# Patient Record
Sex: FEMALE | Race: WHITE | NOT HISPANIC OR LATINO | Employment: FULL TIME | URBAN - METROPOLITAN AREA
[De-identification: names, ages, dates, MRNs, and addresses within clinical notes are randomized per-mention and may not be internally consistent; named-entity substitution may affect disease eponyms.]

---

## 2017-01-13 ENCOUNTER — TELEPHONE (OUTPATIENT)
Dept: FAMILY MEDICINE CLINIC | Facility: CLINIC | Age: 47
End: 2017-01-13

## 2017-01-13 RX ORDER — PHENTERMINE HYDROCHLORIDE 37.5 MG/1
37.5 CAPSULE ORAL EVERY MORNING
Qty: 30 CAPSULE | Refills: 1 | Status: SHIPPED | OUTPATIENT
Start: 2017-01-13 | End: 2017-01-31 | Stop reason: SDUPTHER

## 2017-01-13 NOTE — TELEPHONE ENCOUNTER
Patients office visit was rescheduled to Jan 31st.  She wants to know if she can get another Rx Phentermine since she will be out of them on Monday.

## 2017-01-31 ENCOUNTER — TELEPHONE (OUTPATIENT)
Dept: FAMILY MEDICINE CLINIC | Facility: CLINIC | Age: 47
End: 2017-01-31

## 2017-01-31 ENCOUNTER — OFFICE VISIT (OUTPATIENT)
Dept: FAMILY MEDICINE CLINIC | Facility: CLINIC | Age: 47
End: 2017-01-31

## 2017-01-31 ENCOUNTER — HOSPITAL ENCOUNTER (OUTPATIENT)
Dept: GENERAL RADIOLOGY | Facility: HOSPITAL | Age: 47
Discharge: HOME | End: 2017-01-31
Admitting: FAMILY MEDICINE

## 2017-01-31 VITALS
OXYGEN SATURATION: 99 % | RESPIRATION RATE: 16 BRPM | BODY MASS INDEX: 35 KG/M2 | HEIGHT: 67 IN | WEIGHT: 223 LBS | DIASTOLIC BLOOD PRESSURE: 82 MMHG | HEART RATE: 97 BPM | SYSTOLIC BLOOD PRESSURE: 122 MMHG

## 2017-01-31 DIAGNOSIS — R73.9 HYPERGLYCEMIA: Primary | ICD-10-CM

## 2017-01-31 DIAGNOSIS — G89.29 HIP PAIN, CHRONIC, UNSPECIFIED LATERALITY: ICD-10-CM

## 2017-01-31 DIAGNOSIS — E66.01 OBESITY, CLASS III, BMI 40-49.9 (MORBID OBESITY) (HCC): ICD-10-CM

## 2017-01-31 DIAGNOSIS — E78.2 MIXED HYPERLIPIDEMIA: ICD-10-CM

## 2017-01-31 DIAGNOSIS — I10 ESSENTIAL HYPERTENSION: ICD-10-CM

## 2017-01-31 DIAGNOSIS — M25.559 HIP PAIN, CHRONIC, UNSPECIFIED LATERALITY: ICD-10-CM

## 2017-01-31 PROCEDURE — 73521 X-RAY EXAM HIPS BI 2 VIEWS: CPT

## 2017-01-31 PROCEDURE — 99214 OFFICE O/P EST MOD 30 MIN: CPT | Performed by: FAMILY MEDICINE

## 2017-01-31 RX ORDER — PHENTERMINE HYDROCHLORIDE 37.5 MG/1
37.5 CAPSULE ORAL EVERY MORNING
Qty: 30 CAPSULE | Refills: 1 | Status: SHIPPED | OUTPATIENT
Start: 2017-01-31 | End: 2017-01-31 | Stop reason: SDUPTHER

## 2017-01-31 RX ORDER — PHENTERMINE HYDROCHLORIDE 37.5 MG/1
37.5 CAPSULE ORAL EVERY MORNING
Qty: 30 CAPSULE | Refills: 1 | Status: SHIPPED | OUTPATIENT
Start: 2017-01-31 | End: 2017-02-20 | Stop reason: SDUPTHER

## 2017-01-31 NOTE — MR AVS SNAPSHOT
Mercy Barclaygg   1/31/2017 1:00 PM   Office Visit    Provider:  Adriana Lund MD   Department:  [facility]   Dept Phone:  [phone]                Your Full Care Plan              Today's Medication Changes          These changes are accurate as of: 1/31/17  2:25 PM.  If you have any questions, ask your nurse or doctor.               Medication(s)that have changed:     Topiramate ER 25 MG capsule sustained-release 24 hr   Commonly known as:  TROKENDI XR   Take 1 tablet by mouth Daily.   What changed:  Another medication with the same name was removed. Continue taking this medication, and follow the directions you see here.   Changed by:  Ritu Nichols MA         Stop taking medication(s)listed here:     NEXIUM 40 MG capsule   Generic drug:  esomeprazole   Stopped by:  Adriana Lund MD                Where to Get Your Medications      You can get these medications from any pharmacy     Bring a paper prescription for each of these medications     phentermine 37.5 MG capsule                  Your Updated Medication List          This list is accurate as of: 1/31/17  2:25 PM.  Always use your most recent med list.                hydrochlorothiazide 25 MG tablet   Commonly known as:  HYDRODIURIL   TAKE 1 TABLET EVERY DAY       levothyroxine sodium 137 MCG capsule   Commonly known as:  TIROSINT   Take 1 capsule by mouth Daily.       liothyronine 5 MCG tablet   Commonly known as:  CYTOMEL       loratadine-pseudoephedrine 5-120 MG per 12 hr tablet   Commonly known as:  CLARITIN-D 12-hour       olopatadine 0.2 % solution ophthalmic solution   Commonly known as:  PATADAY       phentermine 37.5 MG capsule   Take 1 capsule by mouth Every Morning.       rizatriptan MLT 10 MG disintegrating tablet   Commonly known as:  MAXALT-MLT       Topiramate ER 25 MG capsule sustained-release 24 hr   Commonly known as:  TROKENDI XR   Take 1 tablet by mouth Daily.               We Performed the Following     CBC and Differential     Comprehensive metabolic panel     Hemoglobin A1c     Lipid Panel With LDL/HDL Ratio     XR Hips Bilateral With or Without Pelvis 2 View       You Were Diagnosed With        Codes Comments    Hyperglycemia    -  Primary ICD-10-CM: R73.9  ICD-9-CM: 790.29     Mixed hyperlipidemia     ICD-10-CM: E78.2  ICD-9-CM: 272.2     Essential hypertension     ICD-10-CM: I10  ICD-9-CM: 401.9     Hip pain, chronic, unspecified laterality     ICD-10-CM: M25.559, G89.29  ICD-9-CM: 719.45, 338.29     Obesity, Class III, BMI 40-49.9 (morbid obesity)     ICD-10-CM: E66.01  ICD-9-CM: 278.01       Instructions     None    Patient Instructions History      [email or web address] Signup     Our records indicate that you have an active [facility] account.    You can view your After Visit Summary by going to [employer] and logging in with your [employer] username and password.  If you don't have a [employer] username and password but a parent or guardian has access to your record, the parent or guardian should login with their own [employer] username and password and access your record to view the After Visit Summary.    If you have questions, you can email [email or web address] or call [phone] to talk to our [employer] staff.  Remember, [employer] is NOT to be used for urgent needs.  For medical emergencies, dial 911.               Other Info from Your Visit           Your Appointments     Apr 28, 2017  1:00 PM EDT   Office Visit with Adriana Lund MD   [facility] (--)    [address] [address] [phone]           Arrive 15 minutes prior to appointment.              Allergies     Victoza [Liraglutide]  GI Intolerance    Colitis by CT      Reason for Visit     Hyperlipidemia     Hypothyroidism           Vital Signs     Blood Pressure Pulse Respirations Height Weight Oxygen Saturation    122/82 (BP  "Location: Right arm, Patient Position: Sitting, Cuff Size: Adult) 97 16 66.5\" (168.9 cm) 223 lb (101 kg) 99%    Body Mass Index Smoking Status                35.45 kg/m2 Never Smoker          Problems and Diagnoses Noted     High blood pressure    Hyperglycemia    High cholesterol or triglycerides    Obesity, Class III, BMI 40-49.9 (morbid obesity)    Chronic hip pain          No Longer an Issue     Tiredness      Results       "

## 2017-01-31 NOTE — PROGRESS NOTES
Subjective   Mercy Duran is a 46 y.o. female.   She  is here today to f/u on   Chief Complaint   Patient presents with   • Hyperlipidemia   • Hypothyroidism   .     History of Present Illness   Mrs. Duran has lost another 15-20 pounds since last visit.  Has continued to change her diet at this point is on very few carbs and eats no processed food.  No soft drinks drinking water.  Was told had hip displasia as child and has had hip pain 35 yrs. Using a lot of NSAIDS.  Her exercise is limited by the pain and that is keeping her from losing as much.  May begin swimming.  No longer taking Nexium and GI upset has resolved.  Fasting blood sugar is 1:30 on today's labs for review even with weight loss.  Has been lower on previous labs.  She was placed on victoza for this same reason last year which caused a hemorrhagic gastroenteritis.  Will hold off on any other pharmacologic intervention as she is continuing to lose weight.  The following portions of the patient's history were reviewed and updated as appropriate: current medications, past family history, past medical history, past social history, past surgical history and problem list.    Review of Systems   Constitutional: Negative.    HENT: Negative.    Eyes: Negative.    Respiratory: Negative.    Cardiovascular: Negative.    Gastrointestinal: Negative.    Endocrine: Negative.    Genitourinary: Negative.    Musculoskeletal: Negative.    Skin: Negative.    Allergic/Immunologic: Negative.    Neurological: Negative.    Hematological: Negative.    Psychiatric/Behavioral: Negative.    All other systems reviewed and are negative.      Objective    Vitals:    01/31/17 1303   BP: 122/82   Pulse: 97   Resp: 16   SpO2: 99%     BP Readings from Last 3 Encounters:   01/31/17 122/82   10/20/16 122/88   09/16/16 120/82       Physical Exam   Constitutional: She is oriented to person, place, and time. She appears well-developed and well-nourished.   Obese   HENT:   Head:  Normocephalic and atraumatic.   Eyes: EOM are normal. Pupils are equal, round, and reactive to light.   Neck: Normal range of motion. Neck supple. No thyromegaly present.   Cardiovascular: Normal rate, regular rhythm, normal heart sounds and intact distal pulses.    Pulmonary/Chest: Effort normal and breath sounds normal.   Musculoskeletal: Normal range of motion. She exhibits no edema.   Lymphadenopathy:     She has no cervical adenopathy.   Neurological: She is alert and oriented to person, place, and time. No cranial nerve deficit.   Skin: Skin is warm and dry.   Psychiatric: She has a normal mood and affect. Her behavior is normal. Judgment and thought content normal.   Nursing note and vitals reviewed.      Invalid input(s): 2W    Assessment/Plan   Mercy was seen today for hyperlipidemia and hypothyroidism.    Diagnoses and all orders for this visit:    Hyperglycemia  -     Comprehensive metabolic panel; Future  -     Lipid Panel With LDL/HDL Ratio; Future  -     CBC and Differential; Future  -     Hemoglobin A1c; Future  -     Comprehensive metabolic panel  -     Lipid Panel With LDL/HDL Ratio  -     CBC and Differential  -     Hemoglobin A1c    Mixed hyperlipidemia  -     Comprehensive metabolic panel; Future  -     Lipid Panel With LDL/HDL Ratio; Future  -     CBC and Differential; Future  -     Hemoglobin A1c; Future  -     Comprehensive metabolic panel  -     Lipid Panel With LDL/HDL Ratio  -     CBC and Differential  -     Hemoglobin A1c    Essential hypertension  -     Comprehensive metabolic panel; Future  -     Lipid Panel With LDL/HDL Ratio; Future  -     CBC and Differential; Future  -     Comprehensive metabolic panel  -     Lipid Panel With LDL/HDL Ratio  -     CBC and Differential    Hip pain, chronic, unspecified laterality  -     XR Hips Bilateral With or Without Pelvis 2 View    Obesity, Class III, BMI 40-49.9 (morbid obesity)    Other orders  -     Discontinue: phentermine 37.5 MG capsule;  Take 1 capsule by mouth Every Morning.  -     phentermine 37.5 MG capsule; Take 1 capsule by mouth Every Morning.        Patient Instructions   Given encouragement in her lifestyle changes.  X-ray today.  Would not hesitate to refer to orthopedic surgery.  Follow-up 3 months time          No visits with results within 2 Week(s) from this visit.  Latest known visit with results is:    Conversion Encounter on 12/13/2016   Component Date Value Ref Range Status   • WBC 12/13/2016 6.6  3.4 - 10.8 x10E3/uL Final   • RBC 12/13/2016 5.29* 3.77 - 5.28 x10E6/uL Final   • Hemoglobin 12/13/2016 15.1  11.1 - 15.9 g/dL Final   • Hematocrit 12/13/2016 44.9  34.0 - 46.6 % Final   • MCV 12/13/2016 85  79 - 97 fL Final   • MCH 12/13/2016 28.5  26.6 - 33.0 pg Final   • MCHC 12/13/2016 33.6  31.5 - 35.7 g/dL Final   • RDW 12/13/2016 12.9  12.3 - 15.4 % Final   • Platelets 12/13/2016 277  150 - 379 x10E3/uL Final   • Neutrophil Rel % 12/13/2016 56  % Final   • Lymphocyte Rel % 12/13/2016 32  % Final   • Monocyte Rel % 12/13/2016 8  % Final   • Eosinophil Rel % 12/13/2016 3  % Final   • Basophil Rel % 12/13/2016 1  % Final   • Neutrophils Absolute 12/13/2016 3.7  1.4 - 7.0 x10E3/uL Final   • Lymphocytes Absolute 12/13/2016 2.1  0.7 - 3.1 x10E3/uL Final   • Monocytes Absolute 12/13/2016 0.5  0.1 - 0.9 x10E3/uL Final   • Eosinophils Absolute 12/13/2016 0.2  0.0 - 0.4 x10E3/uL Final   • Basophils Absolute 12/13/2016 0.0  0.0 - 0.2 x10E3/uL Final   • Immature Granulocyte Rel % 12/13/2016 0  % Final   • Immature Grans Absolute 12/13/2016 0.0  0.0 - 0.1 x10E3/uL Final   • Glucose 12/13/2016 130* 65 - 99 mg/dL Final   • BUN 12/13/2016 13  6 - 24 mg/dL Final   • Creatinine 12/13/2016 0.94  0.57 - 1.00 mg/dL Final   • eGFR Non  Am 12/13/2016 74  >59 mL/min/1.73 Final   • eGFR African Am 12/13/2016 85  >59 mL/min/1.73 Final   • BUN/Creatinine Ratio 12/13/2016 14  9 - 23 Final   • Sodium 12/13/2016 139  134 - 144 mmol/L Final                   **Please note reference interval change**   • Potassium 12/13/2016 4.3  3.5 - 5.2 mmol/L Final   • Chloride 12/13/2016 99  96 - 106 mmol/L Final                  **Please note reference interval change**   • Total CO2 12/13/2016 25  18 - 29 mmol/L Final   • Calcium 12/13/2016 9.4  8.7 - 10.2 mg/dL Final   • Total Protein 12/13/2016 6.5  6.0 - 8.5 g/dL Final   • Albumin 12/13/2016 4.0  3.5 - 5.5 g/dL Final   • Globulin 12/13/2016 2.5  1.5 - 4.5 g/dL Final   • A/G Ratio 12/13/2016 1.6  1.1 - 2.5 Final   • Total Bilirubin 12/13/2016 0.5  0.0 - 1.2 mg/dL Final   • Alkaline Phosphatase 12/13/2016 77  39 - 117 IU/L Final   • AST (SGOT) 12/13/2016 19  0 - 40 IU/L Final   • ALT (SGPT) 12/13/2016 22  0 - 32 IU/L Final   • Total Cholesterol 12/13/2016 206* 100 - 199 mg/dL Final   • Triglycerides 12/13/2016 146  0 - 149 mg/dL Final   • HDL Cholesterol 12/13/2016 40  >39 mg/dL Final   • VLDL Cholesterol 12/13/2016 29  5 - 40 mg/dL Final   • LDL Cholesterol  12/13/2016 137* 0 - 99 mg/dL Final   • Hemoglobin A1C 12/13/2016 5.8* 4.8 - 5.6 % Final    Comment:          Pre-diabetes: 5.7 - 6.4           Diabetes: >6.4           Glycemic control for adults with diabetes: <7.0     • TSH 12/13/2016 0.273* 0.450 - 4.500 uIU/mL Final   • Ambig Abbrev CMP14 Default 12/13/2016 Comment   Final    Comment: A hand-written panel/profile was received from your office. In  accordance with the [facility] Ambiguous Test Code Policy dated July 2003, we have completed your order by using the closest currently  or formerly recognized AMA panel.  We have assigned Comprehensive  Metabolic Panel (14), Test Code #631429 to this request.  If this  is not the testing you wished to receive on this specimen, please  contact the [facility] Client Inquiry/Technical Services Department  to clarify the test order.  We appreciate your business.     • [employer] Default 12/13/2016 Comment   Final    Comment: A hand-written panel/profile was received from your  office. In  accordance with the [facility] Ambiguous Test Code Policy dated July 2003, we have completed your order by using the closest currently  or formerly recognized AMA panel.  We have assigned Lipid Panel,  Test Code #565310 to this request. If this is not the testing you  wished to receive on this specimen, please contact the [facility]  Client Inquiry/Technical Services Department to clarify the test  order.  We appreciate your business.

## 2017-01-31 NOTE — PATIENT INSTRUCTIONS
Given encouragement in her lifestyle changes.  X-ray today.  Would not hesitate to refer to orthopedic surgery.  Follow-up 3 months time

## 2017-01-31 NOTE — TELEPHONE ENCOUNTER
----- Message from Adriana Lund MD sent at 1/31/2017  4:00 PM EST -----  Her hip x-rays were normal.  Ask her to try extra strength Tylenol for her hip pain or ibuprofen and we will discuss at her follow-up in 3 months.  If it gets worse before that she should let me know and I will refer her to orthopedics.  No problem she should not hesitate.

## 2017-02-02 ENCOUNTER — TELEPHONE (OUTPATIENT)
Dept: FAMILY MEDICINE CLINIC | Facility: CLINIC | Age: 47
End: 2017-02-02

## 2017-02-02 DIAGNOSIS — Z87.768 PERSONAL HISTORY OF CONGENITAL HIP DYSPLASIA: ICD-10-CM

## 2017-02-02 DIAGNOSIS — M25.552 BILATERAL HIP PAIN: Primary | ICD-10-CM

## 2017-02-02 DIAGNOSIS — M25.551 BILATERAL HIP PAIN: Primary | ICD-10-CM

## 2017-02-02 NOTE — TELEPHONE ENCOUNTER
Patient called yesterday.  She said that from being put into the positions from the hip x-rays she has been in a lot of pain.  She said her hips throb and ache and it was impossible for her to sleep.  The pain goes all the way down her thighs.  Tylenol Extra Strength does not help.  Do you think she needs additional testing?

## 2017-02-20 ENCOUNTER — OFFICE VISIT (OUTPATIENT)
Dept: FAMILY MEDICINE CLINIC | Facility: CLINIC | Age: 47
End: 2017-02-20

## 2017-02-20 ENCOUNTER — APPOINTMENT (OUTPATIENT)
Dept: CT IMAGING | Facility: HOSPITAL | Age: 47
End: 2017-02-20

## 2017-02-20 VITALS
BODY MASS INDEX: 34.37 KG/M2 | OXYGEN SATURATION: 99 % | SYSTOLIC BLOOD PRESSURE: 120 MMHG | HEIGHT: 67 IN | RESPIRATION RATE: 16 BRPM | WEIGHT: 219 LBS | DIASTOLIC BLOOD PRESSURE: 82 MMHG | HEART RATE: 87 BPM

## 2017-02-20 DIAGNOSIS — R10.33 ABDOMINAL PAIN, ACUTE, PERIUMBILICAL: Primary | ICD-10-CM

## 2017-02-20 PROCEDURE — 99213 OFFICE O/P EST LOW 20 MIN: CPT | Performed by: FAMILY MEDICINE

## 2017-02-20 RX ORDER — PHENTERMINE HYDROCHLORIDE 37.5 MG/1
37.5 CAPSULE ORAL EVERY MORNING
Qty: 30 CAPSULE | Refills: 0
Start: 2017-02-20 | End: 2017-03-21 | Stop reason: SDUPTHER

## 2017-02-20 NOTE — PROGRESS NOTES
Subjective   Mercy Duran is a 46 y.o. female.   She  is here today to f/u on   Chief Complaint   Patient presents with   • Abdominal Pain     Left side X 5 days   .     History of Present Illness   4 Day history of left-sided abdominal pain adjacent to the umbilicus.  Very sharp in nature exacerbated by certain movements.  Feels a constant pressure building since the sharp pain started no nausea.  Has had normal bowel movements.  Had hysterectomy through several ports one of which was umbilicus.  This pain started suddenly 4 days ago.  Very severe when lifting.  When severe pain scale 9-10 over 10.  She went to the urgent care day 2 of pain and was given a GI cocktail which did nothing to dissipate it.  The following portions of the patient's history were reviewed and updated as appropriate: current medications, past family history, past medical history, past social history, past surgical history and problem list.    Review of Systems   Constitutional: Negative.    HENT: Negative.    Eyes: Negative.    Respiratory: Negative.    Cardiovascular: Negative.    Gastrointestinal: Positive for abdominal pain.   Endocrine: Negative.    Genitourinary: Negative.    Musculoskeletal: Negative.    Skin: Negative.    Allergic/Immunologic: Negative.    Neurological: Negative.    Hematological: Negative.    Psychiatric/Behavioral: Negative.        Objective    Vitals:    02/20/17 1051   BP: 120/82   Pulse: 87   Resp: 16   SpO2: 99%     BP Readings from Last 3 Encounters:   02/20/17 120/82   01/31/17 122/82   10/20/16 122/88       Physical Exam   Constitutional: She is oriented to person, place, and time. She appears well-developed and well-nourished.   HENT:   Head: Normocephalic and atraumatic.   Eyes: EOM are normal.   Neck: Normal range of motion. Neck supple. No thyromegaly present.   Cardiovascular: Normal rate, regular rhythm and normal heart sounds.    Pulmonary/Chest: Effort normal and breath sounds normal.   Abdominal:  Soft. There is tenderness. There is guarding. There is no rebound.   4 x 5 cm firm soft tissue mass left and superior to the umbilicus.  Tender.  Positive guarding.  Mass does not change in character with Valsalva.  Generalized tenderness around this area.  Otherwise abdomen soft.  No distention   Musculoskeletal: Normal range of motion. She exhibits no edema.   Lymphadenopathy:     She has no cervical adenopathy.   Neurological: She is alert and oriented to person, place, and time. No cranial nerve deficit.   Skin: Skin is warm and dry.   Psychiatric: She has a normal mood and affect. Her behavior is normal. Judgment and thought content normal.   Nursing note and vitals reviewed.      Invalid input(s): 2W    Assessment/Plan   Mercy was seen today for abdominal pain.    Diagnoses and all orders for this visit:    Abdominal pain, acute, periumbilical  -     CT Abdomen Pelvis With & Without Contrast        Patient Instructions   Acute localized abdominal pain.  Suspect at least partially incarcerated hernia.  Eval with CT.

## 2017-02-21 ENCOUNTER — HOSPITAL ENCOUNTER (OUTPATIENT)
Dept: CT IMAGING | Facility: HOSPITAL | Age: 47
Discharge: HOME | End: 2017-02-21
Admitting: FAMILY MEDICINE

## 2017-02-21 ENCOUNTER — TELEPHONE (OUTPATIENT)
Dept: FAMILY MEDICINE CLINIC | Facility: CLINIC | Age: 47
End: 2017-02-21

## 2017-02-21 PROCEDURE — 0 IOPAMIDOL PER 1 ML: Performed by: FAMILY MEDICINE

## 2017-02-21 PROCEDURE — 74178 CT ABD&PLV WO CNTR FLWD CNTR: CPT

## 2017-02-21 PROCEDURE — 74177 CT ABD & PELVIS W/CONTRAST: CPT

## 2017-02-21 RX ADMIN — IOPAMIDOL 100 ML: 755 INJECTION, SOLUTION INTRAVENOUS at 11:30

## 2017-02-21 NOTE — TELEPHONE ENCOUNTER
----- Message from Adriana Lund MD sent at 2/21/2017  4:07 PM EST -----  CT shows no reason for the pain you are having. I want to refer you to general surg for their opinion.

## 2017-02-23 DIAGNOSIS — R10.33 ABDOMINAL WALL PAIN IN PERIUMBILICAL REGION: Primary | ICD-10-CM

## 2017-03-02 ENCOUNTER — OFFICE VISIT (OUTPATIENT)
Dept: SURGERY | Facility: CLINIC | Age: 47
End: 2017-03-02

## 2017-03-02 VITALS
WEIGHT: 222 LBS | OXYGEN SATURATION: 98 % | DIASTOLIC BLOOD PRESSURE: 81 MMHG | HEART RATE: 90 BPM | HEIGHT: 67 IN | SYSTOLIC BLOOD PRESSURE: 122 MMHG | BODY MASS INDEX: 34.84 KG/M2

## 2017-03-02 DIAGNOSIS — R10.33 PERIUMBILICAL ABDOMINAL PAIN: Primary | ICD-10-CM

## 2017-03-02 PROCEDURE — 99243 OFF/OP CNSLTJ NEW/EST LOW 30: CPT | Performed by: SURGERY

## 2017-03-02 RX ORDER — MELATONIN
1000 DAILY
COMMUNITY
End: 2021-11-12

## 2017-03-02 NOTE — PROGRESS NOTES
Subjective   Mercy Duran is a 46 y.o. female who presents to the office in surgical consultation from Adriana Lund MD for abdominal pain.    History of Present Illness     The patient developed abdominal pain in the mid abdomen just to the left of the umbilicus 2 weeks ago.  She stated that she was sitting at the computer 4 or 5 hours when she felt the abdominal pain develop.  She was in a sitting position leaning slightly forward.  The pain has continued since its onset without improvement.  It has migrated to involve upper outer area of the abdomen and she has even developed lower back pain.  She has had no change in her appetite nor her bowel function.  She has had no nausea or vomiting.  Eating or having a bowel movement does not influence her symptoms.  Her symptoms are worse when sitting or leaning forward.  She has been to an urgent care center and an emergency room since the onset of symptoms.  She has had 2 CT scans of the abdomen and pelvis which have been normal.  She has sat in a hot bath tub with some improvement.    Review of Systems   Constitutional: Negative for activity change, appetite change, fatigue and fever.   Respiratory: Negative for chest tightness and shortness of breath.    Cardiovascular: Negative for chest pain and palpitations.   Gastrointestinal: Negative for abdominal pain, blood in stool, constipation, diarrhea, nausea and vomiting.   Endocrine: Negative for cold intolerance and heat intolerance.   Genitourinary: Negative for dysuria and flank pain.   Musculoskeletal: Positive for back pain. Negative for arthralgias, gait problem and myalgias.   Neurological: Negative for dizziness and light-headedness.   Hematological: Negative for adenopathy. Does not bruise/bleed easily.   Psychiatric/Behavioral: Negative for agitation and confusion.     Past Medical History   Diagnosis Date   • Edema    • Fatigue    • GERD (gastroesophageal reflux disease)    • Goiter    • History of prior  pregnancies       2 (total no.)   • Joint pain    • Methicillin resistant Staphylococcus aureus infection    • PILO (obstructive sleep apnea)      Past Surgical History   Procedure Laterality Date   •  section     • Hysterectomy     • Cervical discectomy     • Tubal abdominal ligation     • Laparoscopic cholecystectomy     • Oophorectomy       one   • Dilatation and curettage       Family History   Problem Relation Age of Onset   • Cancer Paternal Grandfather      Prostate   • Cancer Other      Lung   • Other Other      High Blood Pressure   • Hypertension Father    • Sleep apnea Father      Social History     Social History   • Marital status:      Spouse name: N/A   • Number of children: 5   • Years of education: N/A     Occupational History   • Not on file.     Social History Main Topics   • Smoking status: Never Smoker   • Smokeless tobacco: Not on file   • Alcohol use Yes   • Drug use: No   • Sexual activity: Defer     Other Topics Concern   • Not on file     Social History Narrative       Objective   Physical Exam   Constitutional: She is oriented to person, place, and time. She appears well-developed and well-nourished.  Non-toxic appearance.   HENT:   Head: Normocephalic and atraumatic.   Eyes: EOM are normal. No scleral icterus.   Neck: Normal range of motion. Neck supple.   Pulmonary/Chest: Effort normal. No respiratory distress.   Abdominal: Soft. Normal appearance. There is generalized tenderness (tenderness in the periumbilical region and along the entire right abdomen). There is no rebound and no guarding. No hernia. Hernia confirmed negative in the ventral area.   Neurological: She is alert and oriented to person, place, and time.   Skin: Skin is warm and dry.   Psychiatric: She has a normal mood and affect. Her behavior is normal. Judgment and thought content normal.       Assessment/Plan       The encounter diagnosis was Periumbilical abdominal pain.    The patient has abdominal  pain that is suspicious for abdominal wall strain.  There is no palpable ventral hernia.  Conservative management for abdominal wall strain was carefully discussed with her.  She will return to the office if her symptoms worsen or do not completely resolve.

## 2017-03-21 RX ORDER — PHENTERMINE HYDROCHLORIDE 37.5 MG/1
37.5 CAPSULE ORAL EVERY MORNING
Qty: 30 CAPSULE | Refills: 0
Start: 2017-03-21 | End: 2017-04-28 | Stop reason: SDUPTHER

## 2017-04-27 LAB
ALBUMIN SERPL-MCNC: 3.9 G/DL (ref 3.5–5.5)
ALBUMIN/GLOB SERPL: 1.6 {RATIO} (ref 1.2–2.2)
ALP SERPL-CCNC: 72 IU/L (ref 39–117)
ALT SERPL-CCNC: 14 IU/L (ref 0–32)
AST SERPL-CCNC: 15 IU/L (ref 0–40)
BASOPHILS # BLD AUTO: 0 X10E3/UL (ref 0–0.2)
BASOPHILS NFR BLD AUTO: 0 %
BILIRUB SERPL-MCNC: 0.5 MG/DL (ref 0–1.2)
BUN SERPL-MCNC: 15 MG/DL (ref 6–24)
BUN/CREAT SERPL: 16 (ref 9–23)
CALCIUM SERPL-MCNC: 9.2 MG/DL (ref 8.7–10.2)
CHLORIDE SERPL-SCNC: 98 MMOL/L (ref 96–106)
CHOLEST SERPL-MCNC: 207 MG/DL (ref 100–199)
CO2 SERPL-SCNC: 27 MMOL/L (ref 18–29)
CREAT SERPL-MCNC: 0.96 MG/DL (ref 0.57–1)
EOSINOPHIL # BLD AUTO: 0.1 X10E3/UL (ref 0–0.4)
EOSINOPHIL NFR BLD AUTO: 2 %
ERYTHROCYTE [DISTWIDTH] IN BLOOD BY AUTOMATED COUNT: 13.7 % (ref 12.3–15.4)
GLOBULIN SER CALC-MCNC: 2.4 G/DL (ref 1.5–4.5)
GLUCOSE SERPL-MCNC: 118 MG/DL (ref 65–99)
HBA1C MFR BLD: 5.9 % (ref 4.8–5.6)
HCT VFR BLD AUTO: 46.7 % (ref 34–46.6)
HDLC SERPL-MCNC: 57 MG/DL
HGB BLD-MCNC: 14.9 G/DL (ref 11.1–15.9)
IMM GRANULOCYTES # BLD: 0 X10E3/UL (ref 0–0.1)
IMM GRANULOCYTES NFR BLD: 0 %
LDLC SERPL CALC-MCNC: 133 MG/DL (ref 0–99)
LDLC/HDLC SERPL: 2.3 RATIO UNITS (ref 0–3.2)
LYMPHOCYTES # BLD AUTO: 2.7 X10E3/UL (ref 0.7–3.1)
LYMPHOCYTES NFR BLD AUTO: 30 %
MCH RBC QN AUTO: 28.5 PG (ref 26.6–33)
MCHC RBC AUTO-ENTMCNC: 31.9 G/DL (ref 31.5–35.7)
MCV RBC AUTO: 90 FL (ref 79–97)
MONOCYTES # BLD AUTO: 0.7 X10E3/UL (ref 0.1–0.9)
MONOCYTES NFR BLD AUTO: 8 %
NEUTROPHILS # BLD AUTO: 5.5 X10E3/UL (ref 1.4–7)
NEUTROPHILS NFR BLD AUTO: 60 %
PLATELET # BLD AUTO: 232 X10E3/UL (ref 150–379)
POTASSIUM SERPL-SCNC: 4.1 MMOL/L (ref 3.5–5.2)
PROT SERPL-MCNC: 6.3 G/DL (ref 6–8.5)
RBC # BLD AUTO: 5.22 X10E6/UL (ref 3.77–5.28)
SODIUM SERPL-SCNC: 139 MMOL/L (ref 134–144)
TRIGL SERPL-MCNC: 86 MG/DL (ref 0–149)
VLDLC SERPL CALC-MCNC: 17 MG/DL (ref 5–40)
WBC # BLD AUTO: 9 X10E3/UL (ref 3.4–10.8)

## 2017-04-28 ENCOUNTER — OFFICE VISIT (OUTPATIENT)
Dept: FAMILY MEDICINE CLINIC | Facility: CLINIC | Age: 47
End: 2017-04-28

## 2017-04-28 VITALS
HEART RATE: 87 BPM | OXYGEN SATURATION: 99 % | SYSTOLIC BLOOD PRESSURE: 120 MMHG | RESPIRATION RATE: 16 BRPM | BODY MASS INDEX: 34.21 KG/M2 | WEIGHT: 218 LBS | DIASTOLIC BLOOD PRESSURE: 80 MMHG | HEIGHT: 67 IN

## 2017-04-28 DIAGNOSIS — I10 ESSENTIAL HYPERTENSION: ICD-10-CM

## 2017-04-28 DIAGNOSIS — E66.9 OBESITY (BMI 30.0-34.9): ICD-10-CM

## 2017-04-28 DIAGNOSIS — G43.009 MIGRAINE WITHOUT AURA AND WITHOUT STATUS MIGRAINOSUS, NOT INTRACTABLE: ICD-10-CM

## 2017-04-28 DIAGNOSIS — R73.9 HYPERGLYCEMIA: ICD-10-CM

## 2017-04-28 DIAGNOSIS — E78.2 MIXED HYPERLIPIDEMIA: Primary | ICD-10-CM

## 2017-04-28 PROCEDURE — 99214 OFFICE O/P EST MOD 30 MIN: CPT | Performed by: FAMILY MEDICINE

## 2017-04-28 RX ORDER — PHENTERMINE HYDROCHLORIDE 37.5 MG/1
37.5 CAPSULE ORAL EVERY MORNING
Qty: 30 CAPSULE | Refills: 0 | Status: SHIPPED | OUTPATIENT
Start: 2017-04-28 | End: 2017-06-02 | Stop reason: SDUPTHER

## 2017-04-28 RX ORDER — TOPIRAMATE 25 MG/1
1 CAPSULE, EXTENDED RELEASE ORAL DAILY
Qty: 90 CAPSULE | Refills: 1 | Status: SHIPPED | OUTPATIENT
Start: 2017-04-28 | End: 2017-05-31 | Stop reason: SDUPTHER

## 2017-04-28 NOTE — PATIENT INSTRUCTIONS
Full fasting lab work was reviewed with her today.  Her cholesterol has showed marketed improvement.  Hyperglycemia is just slightly worse

## 2017-04-28 NOTE — PROGRESS NOTES
Subjective   Mercy Duran is a 46 y.o. female.   She  is here today to f/u on   Chief Complaint   Patient presents with   • Hyperlipidemia   • Hypertension   • Hypothyroidism   .     History of Present Illness   First of March Ms. Duran presented to ED [facility] emergency room with continued abdominal pain for which she had seen me in February.  The repeated a CT of the abdomen and did further workup.  ER doc sent to Dr. Nicholas Coffman .  That physician eventually concluded that she had an abdominal wall tear and gave her conservative measures and exercises which eventually resolved her pain.  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  Bilateral cortisone shots which have been very helpful.  Orthopedist repeated to her that she will likely need hip replacement at some point.  Her weight loss has slowed but she has been unable to exercise much b/c of hips.  They are steadily improving and she is going to start swimming.  She has been off of Adipex for the past 2 days and states she almost immediately begins to feel the draggy fatigue she felt prior to starting it.  The following portions of the patient's history were reviewed and updated as appropriate: current medications, past family history, past medical history, past social history, past surgical history and problem list.    Review of Systems   Constitutional: Positive for fatigue.   HENT: Negative.    Eyes: Negative.    Respiratory: Negative.    Cardiovascular: Negative.    Gastrointestinal: Negative.    Endocrine: Negative.    Genitourinary: Negative.    Musculoskeletal: Negative.    Skin: Negative.    Allergic/Immunologic: Negative.    Neurological: Negative.    Hematological: Negative.    Psychiatric/Behavioral: Negative.    All other systems reviewed and are negative.      Objective    Vitals:    04/28/17 1303   BP: 120/80   Pulse: 87   Resp: 16   SpO2: 99%      BP Readings from Last 3 Encounters:   04/28/17 120/80   03/02/17 122/81   02/20/17 120/82       Physical Exam   Constitutional: She is oriented to person, place, and time. She appears well-developed and well-nourished.   HENT:   Head: Normocephalic and atraumatic.   Eyes: EOM are normal.   Neck: Normal range of motion. Neck supple. No thyromegaly present.   Cardiovascular: Normal rate, regular rhythm, normal heart sounds and intact distal pulses.    Pulmonary/Chest: Effort normal and breath sounds normal.   Musculoskeletal: Normal range of motion. She exhibits no edema.   Lymphadenopathy:     She has no cervical adenopathy.   Neurological: She is alert and oriented to person, place, and time. No cranial nerve deficit.   Skin: Skin is warm and dry.   Psychiatric: She has a normal mood and affect. Her behavior is normal. Judgment and thought content normal.   Nursing note and vitals reviewed.      Invalid input(s): 2W    Assessment/Plan   Mercy was seen today for hyperlipidemia, hypertension and hypothyroidism.    Diagnoses and all orders for this visit:    Mixed hyperlipidemia  -     Comprehensive metabolic panel; Future  -     Lipid Panel With LDL/HDL Ratio; Future  -     TSH; Future  -     Hemoglobin A1c; Future  -     Comprehensive metabolic panel  -     Lipid Panel With LDL/HDL Ratio  -     TSH  -     Hemoglobin A1c    Hyperglycemia  -     Comprehensive metabolic panel; Future  -     Hemoglobin A1c; Future  -     Comprehensive metabolic panel  -     Hemoglobin A1c    Essential hypertension  -     Comprehensive metabolic panel; Future  -     Lipid Panel With LDL/HDL Ratio; Future  -     CBC and Differential; Future  -     TSH; Future  -     Comprehensive metabolic panel  -     Lipid Panel With LDL/HDL Ratio  -     CBC and Differential  -     TSH    Migraine without aura and without status migrainosus, not intractable  -     Comprehensive metabolic panel; Future  -     CBC and Differential; Future  -      Comprehensive metabolic panel  -     CBC and Differential    Obesity (BMI 30.0-34.9)  -     TSH; Future  -     Hemoglobin A1c; Future  -     TSH  -     Hemoglobin A1c    Other orders  -     phentermine 37.5 MG capsule; Take 1 capsule by mouth Every Morning.  -     Topiramate ER (TROKENDI XR) 25 MG capsule sustained-release 24 hr; Take 1 tablet by mouth Daily.        Patient Instructions   Full fasting lab work was reviewed with her today.  Her cholesterol has showed marketed improvement.  Hyperglycemia is just slightly worse

## 2017-05-31 RX ORDER — TOPIRAMATE 25 MG/1
CAPSULE, EXTENDED RELEASE ORAL
Qty: 90 CAPSULE | Refills: 1 | Status: SHIPPED | OUTPATIENT
Start: 2017-05-31 | End: 2017-06-27 | Stop reason: SDUPTHER

## 2017-06-02 RX ORDER — PHENTERMINE HYDROCHLORIDE 37.5 MG/1
37.5 CAPSULE ORAL EVERY MORNING
Qty: 30 CAPSULE | Refills: 0 | Status: SHIPPED | OUTPATIENT
Start: 2017-06-02 | End: 2017-06-27 | Stop reason: SDUPTHER

## 2017-06-27 ENCOUNTER — OFFICE VISIT (OUTPATIENT)
Dept: FAMILY MEDICINE CLINIC | Facility: CLINIC | Age: 47
End: 2017-06-27

## 2017-06-27 VITALS
WEIGHT: 219 LBS | DIASTOLIC BLOOD PRESSURE: 76 MMHG | SYSTOLIC BLOOD PRESSURE: 112 MMHG | TEMPERATURE: 98.2 F | HEART RATE: 77 BPM | OXYGEN SATURATION: 98 % | BODY MASS INDEX: 34.37 KG/M2 | HEIGHT: 67 IN | RESPIRATION RATE: 16 BRPM

## 2017-06-27 DIAGNOSIS — M70.71 BILATERAL HIP BURSITIS: ICD-10-CM

## 2017-06-27 DIAGNOSIS — G43.009 MIGRAINE WITHOUT AURA AND WITHOUT STATUS MIGRAINOSUS, NOT INTRACTABLE: ICD-10-CM

## 2017-06-27 DIAGNOSIS — R30.0 DYSURIA: ICD-10-CM

## 2017-06-27 DIAGNOSIS — B37.31 VAGINAL CANDIDIASIS: ICD-10-CM

## 2017-06-27 DIAGNOSIS — M70.72 BILATERAL HIP BURSITIS: ICD-10-CM

## 2017-06-27 DIAGNOSIS — E66.9 OBESITY (BMI 30.0-34.9): Primary | ICD-10-CM

## 2017-06-27 PROBLEM — Q65.89 HIP DYSPLASIA, CONGENITAL: Status: ACTIVE | Noted: 2017-06-27

## 2017-06-27 LAB
BILIRUB BLD-MCNC: NEGATIVE MG/DL
CLARITY, POC: ABNORMAL
COLOR UR: YELLOW
GLUCOSE UR STRIP-MCNC: NEGATIVE MG/DL
KETONES UR QL: NEGATIVE
LEUKOCYTE EST, POC: NEGATIVE
NITRITE UR-MCNC: NEGATIVE MG/ML
PH UR: 6 [PH] (ref 5–8)
PROT UR STRIP-MCNC: NEGATIVE MG/DL
RBC # UR STRIP: ABNORMAL /UL
SP GR UR: 1.02 (ref 1–1.03)
UROBILINOGEN UR QL: NORMAL

## 2017-06-27 PROCEDURE — 81002 URINALYSIS NONAUTO W/O SCOPE: CPT | Performed by: FAMILY MEDICINE

## 2017-06-27 PROCEDURE — 99214 OFFICE O/P EST MOD 30 MIN: CPT | Performed by: FAMILY MEDICINE

## 2017-06-27 RX ORDER — TOPIRAMATE 25 MG/1
25 CAPSULE, EXTENDED RELEASE ORAL DAILY
Qty: 90 CAPSULE | Refills: 2 | Status: SHIPPED | OUTPATIENT
Start: 2017-06-27 | End: 2020-08-06 | Stop reason: SDUPTHER

## 2017-06-27 RX ORDER — CELECOXIB 200 MG/1
200 CAPSULE ORAL DAILY
Qty: 30 CAPSULE | Refills: 6 | Status: SHIPPED | OUTPATIENT
Start: 2017-06-27 | End: 2017-06-28 | Stop reason: SDUPTHER

## 2017-06-27 RX ORDER — FLUCONAZOLE 150 MG/1
150 TABLET ORAL ONCE
Qty: 2 TABLET | Refills: 0 | Status: SHIPPED | OUTPATIENT
Start: 2017-06-27 | End: 2017-06-28 | Stop reason: SDUPTHER

## 2017-06-27 RX ORDER — PHENTERMINE HYDROCHLORIDE 37.5 MG/1
37.5 CAPSULE ORAL EVERY MORNING
Qty: 30 CAPSULE | Refills: 0 | Status: SHIPPED | OUTPATIENT
Start: 2017-06-27 | End: 2017-12-08 | Stop reason: ALTCHOICE

## 2017-06-27 NOTE — PROGRESS NOTES
Subjective   Mercy Duran is a 46 y.o. female.   She  is here today to f/u on   Chief Complaint   Patient presents with   • Obesity   • Urinary Frequency   .     History of Present Illness   Mrs. Duran is here to follow-up on weight loss and has recent history of dysuria.  Her diet is very stable and she is consistent with it.  No she needs to increase her exercise however this is difficult with the chronic bursitis and relative dysplasia of her hips.  4 days urgency and dysuria, and some generalized discomfort.  Had severe URI in the past month and was placed on 2 rounds of antibiotics and finally a course of steroids.  Had steroid shot in her hip for bursitis which was very helpful and orthopedist put her on muscle BX to be taken daily.  The mobile triggered chronic headaches that were similar to her migraines.  And she had to stop it.  Has been using Aleve almost nightly for the pain in her hips which wakes her.  When she does not take it she does not sleep well.  She has used other over the counter NSAIDs with little help.  She has been out of the extended release topiramate for approximately 3 weeks due to health insurance snafu.  We will restart this and switch to generic Celebrex as a 24-hour alternative NSAID less likely to bother her GI tract.  The following portions of the patient's history were reviewed and updated as appropriate: current medications, past family history, past medical history, past social history, past surgical history and problem list.    Review of Systems   Constitutional: Negative.    HENT: Negative.    Eyes: Negative.    Respiratory: Negative.    Cardiovascular: Negative.    Gastrointestinal: Negative.    Endocrine: Negative.    Genitourinary: Positive for dysuria and frequency.   Musculoskeletal: Negative.         Hip pain left   Skin: Negative.    Allergic/Immunologic: Negative.    Neurological: Negative.    Hematological: Negative.    Psychiatric/Behavioral: Negative.    All other  systems reviewed and are negative.      Objective    Vitals:    06/27/17 1325   BP: 112/76   Pulse: 77   Resp: 16   Temp: 98.2 °F (36.8 °C)   SpO2: 98%   Body mass index is 34.3 kg/(m^2).    BP Readings from Last 3 Encounters:   06/27/17 112/76   04/28/17 120/80   03/02/17 122/81       Physical Exam   Constitutional: She is oriented to person, place, and time. She appears well-developed and well-nourished.   HENT:   Head: Normocephalic and atraumatic.   Eyes: EOM are normal.   Neck: Normal range of motion. Neck supple. No thyromegaly present.   Cardiovascular: Normal rate, regular rhythm, normal heart sounds and intact distal pulses.    Pulmonary/Chest: Effort normal and breath sounds normal.   Musculoskeletal: Normal range of motion. She exhibits no edema.   Lymphadenopathy:     She has no cervical adenopathy.   Neurological: She is alert and oriented to person, place, and time. No cranial nerve deficit.   Skin: Skin is warm and dry.   Psychiatric: She has a normal mood and affect. Her behavior is normal. Judgment and thought content normal.   Nursing note and vitals reviewed.      Invalid input(s): 2W    Assessment/Plan   Mercy was seen today for obesity and urinary frequency.    Diagnoses and all orders for this visit:    Obesity (BMI 30.0-34.9)    Migraine without aura and without status migrainosus, not intractable    Vaginal candidiasis    Bilateral hip bursitis    Dysuria  -     POC Urinalysis Dipstick    Other orders  -     phentermine 37.5 MG capsule; Take 1 capsule by mouth Every Morning.  -     Topiramate ER (TROKENDI XR) 25 MG capsule sustained-release 24 hr; Take 25 mg by mouth Daily.  -     celecoxib (CeleBREX) 200 MG capsule; Take 1 capsule by mouth Daily.  -     fluconazole (DIFLUCAN) 150 MG tablet; Take 1 tablet by mouth 1 (One) Time for 1 dose. And repeat in 72 hours        Patient Instructions   Recommend swimming.  Refilled phentermine 30 pills with 1 refill.  She may call for 1 further refill  and see her back in 3 months.

## 2017-06-27 NOTE — PATIENT INSTRUCTIONS
Recommend swimming.  Refilled phentermine 30 pills with 1 refill.  She may call for 1 further refill and see her back in 3 months.

## 2017-06-28 RX ORDER — FLUCONAZOLE 150 MG/1
150 TABLET ORAL ONCE
Qty: 2 TABLET | Refills: 0 | Status: SHIPPED | OUTPATIENT
Start: 2017-06-28 | End: 2017-06-28

## 2017-06-28 RX ORDER — CELECOXIB 200 MG/1
200 CAPSULE ORAL DAILY
Qty: 30 CAPSULE | Refills: 6 | Status: SHIPPED | OUTPATIENT
Start: 2017-06-28 | End: 2017-12-07

## 2017-08-25 RX ORDER — HYDROCHLOROTHIAZIDE 25 MG/1
TABLET ORAL
Qty: 90 TABLET | Refills: 1 | Status: SHIPPED | OUTPATIENT
Start: 2017-08-25 | End: 2018-02-06 | Stop reason: SDUPTHER

## 2017-09-08 ENCOUNTER — OFFICE VISIT (OUTPATIENT)
Dept: FAMILY MEDICINE CLINIC | Facility: CLINIC | Age: 47
End: 2017-09-08

## 2017-09-08 VITALS
WEIGHT: 225 LBS | BODY MASS INDEX: 35.31 KG/M2 | HEART RATE: 67 BPM | HEIGHT: 67 IN | TEMPERATURE: 98 F | OXYGEN SATURATION: 96 % | SYSTOLIC BLOOD PRESSURE: 128 MMHG | DIASTOLIC BLOOD PRESSURE: 98 MMHG

## 2017-09-08 DIAGNOSIS — J06.9 ACUTE URI: ICD-10-CM

## 2017-09-08 DIAGNOSIS — J02.9 ACUTE PHARYNGITIS, UNSPECIFIED ETIOLOGY: Primary | ICD-10-CM

## 2017-09-08 DIAGNOSIS — J04.0 LARYNGITIS, ACUTE: ICD-10-CM

## 2017-09-08 PROCEDURE — 99214 OFFICE O/P EST MOD 30 MIN: CPT | Performed by: FAMILY MEDICINE

## 2017-09-08 RX ORDER — FLUCONAZOLE 150 MG/1
TABLET ORAL
COMMUNITY
Start: 2017-06-29 | End: 2017-09-08

## 2017-09-08 RX ORDER — LEVOTHYROXINE SODIUM 13 UG/1
CAPSULE ORAL
COMMUNITY
Start: 2017-07-28 | End: 2020-08-06

## 2017-09-08 RX ORDER — FLUCONAZOLE 100 MG/1
100 TABLET ORAL DAILY
Qty: 5 TABLET | Refills: 0 | Status: SHIPPED | OUTPATIENT
Start: 2017-09-08 | End: 2017-09-13

## 2017-09-08 NOTE — PATIENT INSTRUCTIONS
Ibuprofen 400-600 mg 3 times a day with food, Diflucan 100 mg once a day for 5 days.  Lab work today call her next week with results.

## 2017-09-08 NOTE — PROGRESS NOTES
Subjective   Mercy Duran is a 47 y.o. female.   She  is here today to f/u on   Chief Complaint   Patient presents with   • Sinusitis     x 10 days has has completed zpack and steroid pack with no relief, has lost voice   .     History of Present Illness   10 days ago started w/ sorethroat.After a couple days it moved to her chest and she had otalgia and some sinus pressure as well.  She was placed on a steroid taper by an urgent care.  After 4 more days there was no improvement she called back and was placed on a Z-Bright.  This is 4 days after that and now her throat is extremely sore feels like razor blades.  Less coughing now and not as much maxillary frontal sinus pressure.  Still with otalgia soft tissue tenderness in her throat and severe pharyngeal pain.  Sweats and chills initially but none now. Very fatigued.  Dr. Engle has discussed recurrent EBV.  The following portions of the patient's history were reviewed and updated as appropriate: current medications, past family history, past medical history, past social history, past surgical history and problem list.    Review of Systems   Constitutional: Positive for fatigue.   HENT: Positive for congestion, ear pain, sinus pressure, sore throat and voice change.    Eyes: Negative.    Respiratory: Negative.    Cardiovascular: Negative.    Gastrointestinal: Negative.    Endocrine: Negative.    Genitourinary: Negative.    Musculoskeletal: Negative.    Skin: Negative.    Allergic/Immunologic: Negative.    Neurological: Negative.    Hematological: Negative.    Psychiatric/Behavioral: Negative.        Objective    Vitals:    09/08/17 1324   BP: 128/98   Pulse: 67   Temp: 98 °F (36.7 °C)   SpO2: 96%     BP Readings from Last 3 Encounters:   09/08/17 128/98   06/27/17 112/76   04/28/17 120/80       Physical Exam   Constitutional: She is oriented to person, place, and time. She appears well-developed and well-nourished.   Very weak voice, fatigue   HENT:   Head:  Normocephalic and atraumatic.   Right Ear: External ear normal.   Left Ear: External ear normal.   Mouth/Throat: No oropharyngeal exudate.   Posterior pharynx with clear drainage visible.  No exudative material.  Mild swelling without erythema.  Tongue is normal and moist there is no white adherent material within the buccal mucosa.  Nares are erythematous and swollen.   Eyes: EOM are normal.   Neck: Normal range of motion. Neck supple.   Cardiovascular: Normal rate, regular rhythm, normal heart sounds and intact distal pulses.    Pulmonary/Chest: Effort normal and breath sounds normal. She has no wheezes.   Lymphadenopathy:     She has no cervical adenopathy.   Neurological: She is alert and oriented to person, place, and time.   Skin: Skin is warm and dry.   Psychiatric: She has a normal mood and affect. Her behavior is normal. Judgment and thought content normal.   Nursing note and vitals reviewed.      Invalid input(s): 2W    Assessment/Plan   Mercy was seen today for sinusitis.    Diagnoses and all orders for this visit:    Acute pharyngitis, unspecified etiology  -     Danny-Barr Virus VCA Antibody Panel  -     CBC & Differential  -     Sedimentation rate, automated    Laryngitis, acute  -     Danny-Barr Virus VCA Antibody Panel  -     CBC & Differential  -     Sedimentation rate, automated    Acute URI  -     CBC & Differential    Other orders  -     fluconazole (DIFLUCAN) 100 MG tablet; Take 1 tablet by mouth Daily for 5 days.        Patient Instructions   Ibuprofen 400-600 mg 3 times a day with food, Diflucan 100 mg once a day for 5 days.  Lab work today call her next week with results.

## 2017-09-09 LAB
BASOPHILS # BLD AUTO: 0.08 10*3/MM3 (ref 0–0.2)
BASOPHILS NFR BLD AUTO: 0.8 % (ref 0–2)
EBV EA IGG SER-ACNC: 99.8 U/ML (ref 0–8.9)
EBV NA IGG SER IA-ACNC: >600 U/ML (ref 0–17.9)
EBV VCA IGG SER IA-ACNC: 134 U/ML (ref 0–17.9)
EBV VCA IGM SER IA-ACNC: <36 U/ML (ref 0–35.9)
EOSINOPHIL # BLD AUTO: 0.22 10*3/MM3 (ref 0.1–0.3)
EOSINOPHIL NFR BLD AUTO: 2.1 % (ref 0–4)
ERYTHROCYTE [DISTWIDTH] IN BLOOD BY AUTOMATED COUNT: 12.8 % (ref 11.5–14.5)
ERYTHROCYTE [SEDIMENTATION RATE] IN BLOOD BY WESTERGREN METHOD: 6 MM/HR (ref 0–20)
HCT VFR BLD AUTO: 48.7 % (ref 37–47)
HGB BLD-MCNC: 16 G/DL (ref 12–16)
IMM GRANULOCYTES # BLD: 0.14 10*3/MM3 (ref 0–0.03)
IMM GRANULOCYTES NFR BLD: 1.3 % (ref 0–0.5)
LYMPHOCYTES # BLD AUTO: 2.92 10*3/MM3 (ref 0.6–4.8)
LYMPHOCYTES NFR BLD AUTO: 27.7 % (ref 20–45)
MCH RBC QN AUTO: 28.7 PG (ref 27–31)
MCHC RBC AUTO-ENTMCNC: 32.9 G/DL (ref 31–37)
MCV RBC AUTO: 87.3 FL (ref 81–99)
MONOCYTES # BLD AUTO: 0.74 10*3/MM3 (ref 0–1)
MONOCYTES NFR BLD AUTO: 7 % (ref 3–8)
NEUTROPHILS # BLD AUTO: 6.44 10*3/MM3 (ref 1.5–8.3)
NEUTROPHILS NFR BLD AUTO: 61.1 % (ref 45–70)
NRBC BLD AUTO-RTO: 0 /100 WBC (ref 0–0)
PLATELET # BLD AUTO: 294 10*3/MM3 (ref 140–500)
RBC # BLD AUTO: 5.58 10*6/MM3 (ref 4.2–5.4)
SERVICE CMNT-IMP: ABNORMAL
WBC # BLD AUTO: 10.54 10*3/MM3 (ref 4.8–10.8)

## 2017-09-12 ENCOUNTER — TELEPHONE (OUTPATIENT)
Dept: FAMILY MEDICINE CLINIC | Facility: CLINIC | Age: 47
End: 2017-09-12

## 2017-09-12 NOTE — TELEPHONE ENCOUNTER
Patient was seen on Friday.  She said she does pretty well during the day with Ibuprofen  but at night her throat is really bad because she can't stop coughing.  Any suggestions?

## 2017-12-07 ENCOUNTER — OFFICE VISIT (OUTPATIENT)
Dept: OBSTETRICS AND GYNECOLOGY | Age: 47
End: 2017-12-07

## 2017-12-07 VITALS
SYSTOLIC BLOOD PRESSURE: 124 MMHG | BODY MASS INDEX: 36.41 KG/M2 | DIASTOLIC BLOOD PRESSURE: 78 MMHG | HEIGHT: 67 IN | WEIGHT: 232 LBS

## 2017-12-07 DIAGNOSIS — Z01.419 ENCOUNTER FOR GYNECOLOGICAL EXAMINATION WITHOUT ABNORMAL FINDING: Primary | ICD-10-CM

## 2017-12-07 DIAGNOSIS — N89.8 VAGINA ITCHING: ICD-10-CM

## 2017-12-07 PROBLEM — B27.90 EBV INFECTION: Status: ACTIVE | Noted: 2017-12-07

## 2017-12-07 PROCEDURE — 99396 PREV VISIT EST AGE 40-64: CPT | Performed by: OBSTETRICS & GYNECOLOGY

## 2017-12-07 NOTE — PROGRESS NOTES
"Subjective     Chief Complaint   Patient presents with   • Gynecologic Exam     AC       History of Present Illness    Mercy Duran is a 47 y.o.  who presents for annual exam. LAVH LSO   No hot flashes   Had Mono and thyroid is being adjusted. Gained weight. Recurring Danny gomez.      renal cancer - can not have sex  Obstetric History:  OB History      Para Term  AB Living    2 2 2   2    SAB TAB Ectopic Multiple Live Births        2         Menstrual History:     No LMP recorded. Patient has had a hysterectomy.         Current contraception: status post hysterectomy  History of abnormal Pap smear: ANTONINO 1  Received Gardasil immunization: no  Perform regular self breast exam: yes  Family history of uterine or ovarian cancer: no  Family History of colon cancer: no  Family history of breast cancer: no    Mammogram: done today.  Colonoscopy: not indicated.  DEXA: not indicated.    Exercise: not active  Calcium/Vitamin D: adequate intake    The following portions of the patient's history were reviewed and updated as appropriate: allergies, current medications, past family history, past medical history, past social history, past surgical history and problem list.    Review of Systems    Review of Systems   Constitutional: Negative for fatigue.   Respiratory: Negative for shortness of breath.    Gastrointestinal: Negative for abdominal pain.   Genitourinary: Negative for dysuria.   Neurological: Negative for headaches.   Psychiatric/Behavioral: Negative for dysphoric mood.         Objective   Physical Exam    /78  Ht 170.2 cm (67\")  Wt 105 kg (232 lb)  BMI 36.34 kg/m2    General:   alert, appears stated age and cooperative   Neck: no asymmetry, masses, or scars, no adenopathy and thyroid normal to palpation   Heart: regular rate and rhythm   Lungs: clear to auscultation bilaterally   Abdomen: soft, non-tender, without masses or organomegaly   Breast: inspection negative, no nipple " discharge or bleeding, no masses or nodularity palpable   Vulva: normal, Bartholin's, Urethra, La Conner's normal   Vagina: normal mucosa, normal discharge   Cervix: absent   Uterus: absent   Adnexa: no mass, fullness, tenderness   Rectal: normal rectal, no masses     Assessment/Plan   Mercy was seen today for gynecologic exam.    Diagnoses and all orders for this visit:    Encounter for gynecological examination without abnormal finding    Had yeast - check swab - exam normal  All questions answered.  Breast self exam technique reviewed and patient encouraged to perform self-exam monthly.  Discussed healthy lifestyle modifications.  Recommended 30 minutes of aerobic exercise five times per week.  Discussed calcium needs to prevent osteoporosis.

## 2017-12-08 ENCOUNTER — OFFICE VISIT (OUTPATIENT)
Dept: FAMILY MEDICINE CLINIC | Facility: CLINIC | Age: 47
End: 2017-12-08

## 2017-12-08 VITALS
HEIGHT: 67 IN | TEMPERATURE: 97.9 F | HEART RATE: 100 BPM | BODY MASS INDEX: 36.73 KG/M2 | RESPIRATION RATE: 16 BRPM | WEIGHT: 234 LBS | OXYGEN SATURATION: 99 % | SYSTOLIC BLOOD PRESSURE: 118 MMHG | DIASTOLIC BLOOD PRESSURE: 80 MMHG

## 2017-12-08 DIAGNOSIS — I10 ESSENTIAL HYPERTENSION: ICD-10-CM

## 2017-12-08 DIAGNOSIS — E03.4 HYPOTHYROIDISM DUE TO ACQUIRED ATROPHY OF THYROID: ICD-10-CM

## 2017-12-08 DIAGNOSIS — E78.2 MIXED HYPERLIPIDEMIA: Primary | ICD-10-CM

## 2017-12-08 DIAGNOSIS — E66.9 OBESITY (BMI 30.0-34.9): ICD-10-CM

## 2017-12-08 DIAGNOSIS — R73.9 HYPERGLYCEMIA: ICD-10-CM

## 2017-12-08 PROCEDURE — 99214 OFFICE O/P EST MOD 30 MIN: CPT | Performed by: FAMILY MEDICINE

## 2017-12-08 NOTE — PROGRESS NOTES
Subjective   Mercy Duran is a 47 y.o. female.   She  is here today to f/u on   Chief Complaint   Patient presents with   • Hypertension   • Hyperlipidemia   .     History of Present Illness   Has had mono and sinusitis in last month.  Inactive during that time. Feeling some better now. Was not on phentermine past couple weeks b/c disturbs sleep. Has not been as regimented with her diet and was unable to exercise for 2 weeks.  Hemoglobin A1c 5.6, TSH 10.3, total cholesterol 246, triglycerides 231, HDLs 42, , glucose 124, creatinine 0.97, GFR 71. Last A1c was 5.9.  When she was on metformin previously she tried several formulations and they all gave her extreme diarrhea.  The following portions of the patient's history were reviewed and updated as appropriate: current medications, past family history, past medical history, past social history, past surgical history and problem list.    Review of Systems   Constitutional: Positive for fatigue.   HENT: Negative.    Eyes: Negative.    Respiratory: Negative.    Cardiovascular: Negative.    Gastrointestinal: Negative.    Endocrine: Negative.    Genitourinary: Negative.    Musculoskeletal: Negative.    Skin: Negative.    Allergic/Immunologic: Negative.    Neurological: Negative.    Hematological: Negative.    Psychiatric/Behavioral: Negative.    All other systems reviewed and are negative.      Objective    Vitals:    12/08/17 0908   BP: 118/80   Pulse: 100   Resp: 16   Temp: 97.9 °F (36.6 °C)   SpO2: 99%   Body mass index is 36.64 kg/(m^2).  Allergies   Allergen Reactions   • Victoza [Liraglutide] GI Intolerance     Colitis by CT       BP Readings from Last 3 Encounters:   12/08/17 118/80   12/07/17 124/78   09/08/17 128/98       Physical Exam   Constitutional: She is oriented to person, place, and time. She appears well-developed and well-nourished.   Appears tired   HENT:   Head: Normocephalic and atraumatic.   Eyes: EOM are normal.   Neck: Neck supple. No  thyromegaly present.   Cardiovascular: Normal rate, regular rhythm, normal heart sounds and intact distal pulses.    Pulmonary/Chest: Effort normal and breath sounds normal.   Musculoskeletal: Normal range of motion. She exhibits no edema.   Lymphadenopathy:     She has no cervical adenopathy.   Neurological: She is alert and oriented to person, place, and time. No cranial nerve deficit.   Skin: Skin is warm and dry.   Psychiatric: She has a normal mood and affect. Her behavior is normal. Judgment and thought content normal.   Nursing note and vitals reviewed.      Invalid input(s): 2W    Assessment/Plan   Mercy was seen today for hypertension and hyperlipidemia.    Diagnoses and all orders for this visit:    Mixed hyperlipidemia    Obesity (BMI 30.0-34.9)    Hyperglycemia    Hypothyroidism due to acquired atrophy of thyroid    Essential hypertension    Other orders  -     naltrexone-bupropion ER (CONTRAVE) 8-90 MG tablet; Take 2 tablets by mouth 2 (Two) Times a Day.        Patient Instructions   Trial of contrave b/c adipex disturbs sleep although it has been very helpful for weight loss.

## 2017-12-22 ENCOUNTER — TELEPHONE (OUTPATIENT)
Dept: OBSTETRICS AND GYNECOLOGY | Age: 47
End: 2017-12-22

## 2017-12-22 LAB
C ALBICANS DNA VAG QL NAA+PROBE: NEGATIVE
C GLABRATA DNA VAG QL NAA+PROBE: NEGATIVE
C KRUSEI DNA VAG QL NAA+PROBE: NEGATIVE
C LUSITANIAE DNA VAG QL NAA+PROBE: NEGATIVE
C PARAP DNA VAG QL NAA+PROBE: NEGATIVE
C TROPICLS DNA VAG QL NAA+PROBE: NEGATIVE

## 2018-02-06 RX ORDER — HYDROCHLOROTHIAZIDE 25 MG/1
TABLET ORAL
Qty: 90 TABLET | Refills: 1 | Status: SHIPPED | OUTPATIENT
Start: 2018-02-06 | End: 2018-07-16 | Stop reason: SDUPTHER

## 2018-04-17 ENCOUNTER — OFFICE VISIT (OUTPATIENT)
Dept: FAMILY MEDICINE CLINIC | Facility: CLINIC | Age: 48
End: 2018-04-17

## 2018-04-17 VITALS
DIASTOLIC BLOOD PRESSURE: 84 MMHG | BODY MASS INDEX: 38.92 KG/M2 | OXYGEN SATURATION: 98 % | WEIGHT: 248 LBS | HEIGHT: 67 IN | HEART RATE: 89 BPM | TEMPERATURE: 98.1 F | SYSTOLIC BLOOD PRESSURE: 130 MMHG | RESPIRATION RATE: 16 BRPM

## 2018-04-17 DIAGNOSIS — R73.9 HYPERGLYCEMIA: ICD-10-CM

## 2018-04-17 DIAGNOSIS — L30.0 NUMMULAR ECZEMA: ICD-10-CM

## 2018-04-17 DIAGNOSIS — E03.4 HYPOTHYROIDISM DUE TO ACQUIRED ATROPHY OF THYROID: ICD-10-CM

## 2018-04-17 DIAGNOSIS — IMO0001 CLASS 2 OBESITY DUE TO EXCESS CALORIES WITH SERIOUS COMORBIDITY AND BODY MASS INDEX (BMI) OF 38.0 TO 38.9 IN ADULT: ICD-10-CM

## 2018-04-17 DIAGNOSIS — E78.2 MIXED HYPERLIPIDEMIA: ICD-10-CM

## 2018-04-17 DIAGNOSIS — I10 ESSENTIAL HYPERTENSION: Primary | ICD-10-CM

## 2018-04-17 LAB
BASOPHILS # BLD AUTO: 0.07 10*3/MM3 (ref 0–0.2)
BASOPHILS NFR BLD AUTO: 0.9 % (ref 0–2)
EOSINOPHIL # BLD AUTO: 0.32 10*3/MM3 (ref 0.1–0.3)
EOSINOPHIL NFR BLD AUTO: 4 % (ref 0–4)
ERYTHROCYTE [DISTWIDTH] IN BLOOD BY AUTOMATED COUNT: 12.9 % (ref 11.5–14.5)
HBA1C MFR BLD: 5.7 % (ref 4.8–5.6)
HCT VFR BLD AUTO: 45.3 % (ref 37–47)
HGB BLD-MCNC: 14.8 G/DL (ref 12–16)
IMM GRANULOCYTES # BLD: 0.04 10*3/MM3 (ref 0–0.03)
IMM GRANULOCYTES NFR BLD: 0.5 % (ref 0–0.5)
LYMPHOCYTES # BLD AUTO: 2.56 10*3/MM3 (ref 0.6–4.8)
LYMPHOCYTES NFR BLD AUTO: 31.7 % (ref 20–45)
MCH RBC QN AUTO: 28.8 PG (ref 27–31)
MCHC RBC AUTO-ENTMCNC: 32.7 G/DL (ref 31–37)
MCV RBC AUTO: 88.3 FL (ref 81–99)
MONOCYTES # BLD AUTO: 0.58 10*3/MM3 (ref 0–1)
MONOCYTES NFR BLD AUTO: 7.2 % (ref 3–8)
NEUTROPHILS # BLD AUTO: 4.5 10*3/MM3 (ref 1.5–8.3)
NEUTROPHILS NFR BLD AUTO: 55.7 % (ref 45–70)
NRBC BLD AUTO-RTO: 0 /100 WBC (ref 0–0)
PLATELET # BLD AUTO: 256 10*3/MM3 (ref 140–500)
RBC # BLD AUTO: 5.13 10*6/MM3 (ref 4.2–5.4)
WBC # BLD AUTO: 8.07 10*3/MM3 (ref 4.8–10.8)

## 2018-04-17 PROCEDURE — 99214 OFFICE O/P EST MOD 30 MIN: CPT | Performed by: FAMILY MEDICINE

## 2018-04-17 RX ORDER — METFORMIN HYDROCHLORIDE 500 MG/1
500 TABLET, FILM COATED, EXTENDED RELEASE ORAL
Qty: 30 TABLET | Refills: 7 | Status: SHIPPED | OUTPATIENT
Start: 2018-04-17 | End: 2018-04-20

## 2018-04-17 RX ORDER — PHENTERMINE HYDROCHLORIDE 37.5 MG/1
37.5 TABLET ORAL
Qty: 30 TABLET | Refills: 1 | Status: SHIPPED | OUTPATIENT
Start: 2018-04-17 | End: 2020-08-06 | Stop reason: SDUPTHER

## 2018-04-17 NOTE — PATIENT INSTRUCTIONS
Start [id number] mg once daily with breakfast.  If develops significant diarrhea she will have to stop.  Add phentermine to try and stabilize her appetite and increase her energy some as it did in the past bringing her back up to a level of normal.  confer with Dr. Holman's on this. CBC and hemoglobin A1c today and call her with those results.

## 2018-04-17 NOTE — PROGRESS NOTES
Subjective   Mercy Duran is a 47 y.o. female.   She  is here today to f/u on   Chief Complaint   Patient presents with   • Hypertension   • Hyperlipidemia   • Hypothyroidism   .     History of Present Illness   Here to f/u on htn, hld, hyperglycemia, and hypothyroidism.  She has been very stressed with an unexpected move and selling her home.  Very tired,  Holman increased her T3 by a factor of ten and her TSH is in [place] range but she still feels sluggish.  She has gained close to 20#.  Does not sleep well. Not getting enough exercise.  Metformin IR caused diarrhea in past.  The following portions of the patient's history were reviewed and updated as appropriate: current medications, past family history, past medical history, past social history, past surgical history and problem list.    Review of Systems   Constitutional: Positive for fatigue.   HENT: Positive for postnasal drip and rhinorrhea.    All other systems reviewed and are negative.      Objective    Vitals:    04/17/18 0934   BP: 130/84   Pulse: 89   Resp: 16   Temp: 98.1 °F (36.7 °C)   SpO2: 98%   Body mass index is 38.83 kg/m².  Allergies   Allergen Reactions   • Victoza [Liraglutide] GI Intolerance     Colitis by CT       BP Readings from Last 3 Encounters:   04/17/18 130/84   12/08/17 118/80   12/07/17 124/78       Physical Exam   Constitutional: She is oriented to person, place, and time. She appears well-developed and well-nourished.   HENT:   Head: Normocephalic and atraumatic.   Nose: Nose normal.   Eyes: EOM are normal.   Neck: Neck supple.   Cardiovascular: Normal rate, regular rhythm and normal heart sounds.    Pulmonary/Chest: Effort normal and breath sounds normal.   Musculoskeletal: Normal range of motion. She exhibits no edema.   Neurological: She is alert and oriented to person, place, and time. No cranial nerve deficit.   Skin: Skin is warm and dry.   Psychiatric: She has a normal mood and affect. Her behavior is normal.  Judgment and thought content normal.       Invalid input(s): 2W    Assessment/Plan   Mercy was seen today for hypertension, hyperlipidemia and hypothyroidism.    Diagnoses and all orders for this visit:    Essential hypertension  -     CBC & Differential  -     Hemoglobin A1c    Mixed hyperlipidemia    Hyperglycemia  -     CBC & Differential  -     Hemoglobin A1c    Hypothyroidism due to acquired atrophy of thyroid    Nummular eczema    Class 2 obesity due to excess calories with serious comorbidity and body mass index (BMI) of 38.0 to 38.9 in adult  -     CBC & Differential  -     Hemoglobin A1c    Other orders  -     metFORMIN (GLUMETZA) 500 MG (MOD) 24 hr tablet; Take 1 tablet by mouth Daily With Breakfast.  -     betamethasone valerate (VALISONE) 0.1 % ointment; Apply  topically 2 (Two) Times a Day.  -     phentermine (ADIPEX-P) 37.5 MG tablet; Take 1 tablet by mouth Every Morning Before Breakfast.        Patient Instructions   Start [id number] mg once daily with breakfast.  If develops significant diarrhea she will have to stop.  Add phentermine to try and stabilize her appetite and increase her energy some as it did in the past bringing her back up to a level of normal.  confer with Dr. Holman's on this. CBC and hemoglobin A1c today and call her with those results.

## 2018-04-19 ENCOUNTER — TELEPHONE (OUTPATIENT)
Dept: FAMILY MEDICINE CLINIC | Facility: CLINIC | Age: 48
End: 2018-04-19

## 2018-04-19 NOTE — TELEPHONE ENCOUNTER
Pt said the Metformin you prescribed her is not covered by her insurance and it will cost $1100 a month.  Will you Rx something else please.

## 2018-04-20 RX ORDER — METFORMIN HYDROCHLORIDE 750 MG/1
750 TABLET, EXTENDED RELEASE ORAL
Qty: 30 TABLET | Refills: 5 | Status: SHIPPED | OUTPATIENT
Start: 2018-04-20 | End: 2020-08-06 | Stop reason: SDUPTHER

## 2018-04-20 NOTE — TELEPHONE ENCOUNTER
Changed to Metformin ER (Glucophage XR) by Dr. Lund.  Advised pt to call on Monday if insurance does not cover.

## 2018-07-16 RX ORDER — HYDROCHLOROTHIAZIDE 25 MG/1
25 TABLET ORAL DAILY
Qty: 90 TABLET | Refills: 1 | Status: SHIPPED | OUTPATIENT
Start: 2018-07-16 | End: 2018-12-25 | Stop reason: SDUPTHER

## 2018-12-10 ENCOUNTER — APPOINTMENT (OUTPATIENT)
Dept: WOMENS IMAGING | Facility: HOSPITAL | Age: 48
End: 2018-12-10

## 2018-12-10 ENCOUNTER — PROCEDURE VISIT (OUTPATIENT)
Dept: OBSTETRICS AND GYNECOLOGY | Age: 48
End: 2018-12-10

## 2018-12-10 ENCOUNTER — OFFICE VISIT (OUTPATIENT)
Dept: OBSTETRICS AND GYNECOLOGY | Age: 48
End: 2018-12-10

## 2018-12-10 VITALS
SYSTOLIC BLOOD PRESSURE: 128 MMHG | HEIGHT: 67 IN | BODY MASS INDEX: 40.65 KG/M2 | DIASTOLIC BLOOD PRESSURE: 84 MMHG | WEIGHT: 259 LBS

## 2018-12-10 DIAGNOSIS — Z12.11 COLON CANCER SCREENING: Primary | ICD-10-CM

## 2018-12-10 DIAGNOSIS — Z12.31 VISIT FOR SCREENING MAMMOGRAM: Primary | ICD-10-CM

## 2018-12-10 DIAGNOSIS — Z01.419 ENCOUNTER FOR GYNECOLOGICAL EXAMINATION WITHOUT ABNORMAL FINDING: ICD-10-CM

## 2018-12-10 PROCEDURE — 77067 SCR MAMMO BI INCL CAD: CPT | Performed by: OBSTETRICS & GYNECOLOGY

## 2018-12-10 PROCEDURE — 77067 SCR MAMMO BI INCL CAD: CPT | Performed by: RADIOLOGY

## 2018-12-10 PROCEDURE — 99396 PREV VISIT EST AGE 40-64: CPT | Performed by: OBSTETRICS & GYNECOLOGY

## 2018-12-10 RX ORDER — LIOTHYRONINE SODIUM 25 UG/1
25 TABLET ORAL DAILY
COMMUNITY
Start: 2018-10-05 | End: 2018-12-10 | Stop reason: SDUPTHER

## 2018-12-10 RX ORDER — CYCLOBENZAPRINE HCL 5 MG
5 TABLET ORAL
COMMUNITY
Start: 2018-11-19 | End: 2020-08-06 | Stop reason: SDUPTHER

## 2018-12-10 NOTE — PROGRESS NOTES
Subjective     Chief Complaint   Patient presents with   • Gynecologic Exam     AC       History of Present Illness    Mercy Duran is a 48 y.o.  who presents for annual exam.  Patient had a previous LAVH and LSO.  She is not having any pelvic pain.  Her  is doing well.  He had renal cancer and had a kidney removed but is now cancer free.  He does have impotence.  Patient denies any depression.    She has struggled with hypothyroidism and weight gain.  She is also had some back problems and is currently in physical therapy.  She has lost some weight earlier this year with exercise.  She is hoping to get back to that.  She also has vulvar recurrent mono-like symptoms     Obstetric History:  OB History      Para Term  AB Living    2 2 2     2    SAB TAB Ectopic Molar Multiple Live Births              2         Menstrual History:     No LMP recorded. Patient has had a hysterectomy.         Current contraception: status post hysterectomy  History of abnormal Pap smear: yes - ANTONINO 1  Received Gardasil immunization: no  Perform regular self breast exam: yes  Family history of uterine or ovarian cancer: no  Family History of colon cancer: no  Family history of breast cancer: no    Mammogram: done today.  Colonoscopy: recommended.  DEXA: not indicated.    Exercise: no in PT for back pain   Calcium/Vitamin D: adequate intake and uses supplements    The following portions of the patient's history were reviewed and updated as appropriate: allergies, current medications, past family history, past medical history, past social history, past surgical history and problem list.    Review of Systems    Review of Systems   Constitutional: Negative for fatigue.   Respiratory: Negative for shortness of breath.    Gastrointestinal: Negative for abdominal pain.   Genitourinary: Negative for dysuria.   Neurological: Negative for headaches.   Psychiatric/Behavioral: Negative for dysphoric mood.         Objective  "  Physical Exam    /84   Ht 170.2 cm (67\")   Wt 117 kg (259 lb)   BMI 40.57 kg/m²     General:   alert, appears stated age and cooperative   Neck: thyroid normal to palpation   Heart: regular rate and rhythm   Lungs: clear to auscultation bilaterally   Abdomen: soft, non-tender, without masses or organomegaly   Breast: inspection negative, no nipple discharge or bleeding, no masses or nodularity palpable   Vulva: normal, Bartholin's, Urethra, [place]'s normal   Vagina: normal mucosa, normal discharge   Cervix: absent   Uterus: Absent   Adnexa: no mass, fullness, tenderness   Rectal: normal rectal, no masses     Assessment/Plan   Mercy was seen today for gynecologic exam.    Diagnoses and all orders for this visit:    Colon cancer screening  -     Ambulatory Referral For Screening Colonoscopy    Encounter for gynecological examination without abnormal finding      She has had difficult year.  I encouraged patient to exercise when she has less back pain.  We will send patient for screening colonoscopy.  All questions answered.  Breast self exam technique reviewed and patient encouraged to perform self-exam monthly.  Discussed healthy lifestyle modifications.  Recommended 30 minutes of aerobic exercise five times per week.  Discussed calcium needs to prevent osteoporosis.                 "

## 2018-12-26 RX ORDER — HYDROCHLOROTHIAZIDE 25 MG/1
TABLET ORAL
Qty: 90 TABLET | Refills: 1 | Status: SHIPPED | OUTPATIENT
Start: 2018-12-26 | End: 2020-08-06

## 2019-05-28 RX ORDER — HYDROCHLOROTHIAZIDE 25 MG/1
TABLET ORAL
Qty: 90 TABLET | Refills: 1 | Status: SHIPPED | OUTPATIENT
Start: 2019-05-28 | End: 2020-08-06

## 2020-08-06 ENCOUNTER — APPOINTMENT (OUTPATIENT)
Dept: WOMENS IMAGING | Facility: HOSPITAL | Age: 50
End: 2020-08-06

## 2020-08-06 ENCOUNTER — OFFICE VISIT (OUTPATIENT)
Dept: OBSTETRICS AND GYNECOLOGY | Age: 50
End: 2020-08-06

## 2020-08-06 ENCOUNTER — PROCEDURE VISIT (OUTPATIENT)
Dept: OBSTETRICS AND GYNECOLOGY | Age: 50
End: 2020-08-06

## 2020-08-06 VITALS
HEIGHT: 67 IN | WEIGHT: 226 LBS | BODY MASS INDEX: 35.47 KG/M2 | SYSTOLIC BLOOD PRESSURE: 122 MMHG | DIASTOLIC BLOOD PRESSURE: 78 MMHG

## 2020-08-06 DIAGNOSIS — Z12.31 VISIT FOR SCREENING MAMMOGRAM: Primary | ICD-10-CM

## 2020-08-06 DIAGNOSIS — Z01.419 ENCOUNTER FOR GYNECOLOGICAL EXAMINATION WITHOUT ABNORMAL FINDING: Primary | ICD-10-CM

## 2020-08-06 PROCEDURE — 77067 SCR MAMMO BI INCL CAD: CPT | Performed by: OBSTETRICS & GYNECOLOGY

## 2020-08-06 PROCEDURE — 77067 SCR MAMMO BI INCL CAD: CPT | Performed by: RADIOLOGY

## 2020-08-06 PROCEDURE — 99396 PREV VISIT EST AGE 40-64: CPT | Performed by: OBSTETRICS & GYNECOLOGY

## 2020-08-06 RX ORDER — ASCORBIC ACID 1000 MG
TABLET, EXTENDED RELEASE ORAL
COMMUNITY
End: 2021-11-12

## 2020-08-06 RX ORDER — FUROSEMIDE 20 MG/1
20 TABLET ORAL DAILY
COMMUNITY
Start: 2019-12-17 | End: 2020-12-16

## 2021-03-31 ENCOUNTER — TELEPHONE (OUTPATIENT)
Dept: SURGERY | Facility: CLINIC | Age: 51
End: 2021-03-31

## 2021-03-31 NOTE — TELEPHONE ENCOUNTER
Patient needs to set up appt . Have not been in the office in five years . Last seen Dr Blanchard 2017 .     Patient contact is listed     Was trying to get set up with Provider Jero .

## 2021-04-05 ENCOUNTER — OFFICE VISIT (OUTPATIENT)
Dept: GASTROENTEROLOGY | Facility: CLINIC | Age: 51
End: 2021-04-05

## 2021-04-05 ENCOUNTER — TRANSCRIBE ORDERS (OUTPATIENT)
Dept: ADMINISTRATIVE | Facility: HOSPITAL | Age: 51
End: 2021-04-05

## 2021-04-05 VITALS — HEIGHT: 67 IN | TEMPERATURE: 97.2 F | WEIGHT: 254.2 LBS | BODY MASS INDEX: 39.9 KG/M2

## 2021-04-05 DIAGNOSIS — K21.00 GASTROESOPHAGEAL REFLUX DISEASE WITH ESOPHAGITIS WITHOUT HEMORRHAGE: ICD-10-CM

## 2021-04-05 DIAGNOSIS — R10.84 GENERALIZED ABDOMINAL PAIN: Primary | ICD-10-CM

## 2021-04-05 DIAGNOSIS — U07.1 COVID-19: Primary | ICD-10-CM

## 2021-04-05 DIAGNOSIS — Z12.11 ENCOUNTER FOR SCREENING FOR MALIGNANT NEOPLASM OF COLON: ICD-10-CM

## 2021-04-05 DIAGNOSIS — E03.4 HYPOTHYROIDISM DUE TO ACQUIRED ATROPHY OF THYROID: ICD-10-CM

## 2021-04-05 PROCEDURE — 99204 OFFICE O/P NEW MOD 45 MIN: CPT | Performed by: INTERNAL MEDICINE

## 2021-04-05 RX ORDER — FAMCICLOVIR 250 MG/1
250 TABLET ORAL DAILY
COMMUNITY
Start: 2021-03-24 | End: 2021-11-12

## 2021-04-05 RX ORDER — ESOMEPRAZOLE MAGNESIUM 40 MG/1
40 CAPSULE, DELAYED RELEASE ORAL DAILY
Qty: 90 CAPSULE | Refills: 3 | Status: SHIPPED | OUTPATIENT
Start: 2021-04-05 | End: 2022-07-11

## 2021-04-05 RX ORDER — FUROSEMIDE 20 MG/1
20 TABLET ORAL DAILY
COMMUNITY
Start: 2021-02-12 | End: 2022-02-12

## 2021-04-05 RX ORDER — POTASSIUM 75 MG
TABLET ORAL
COMMUNITY
Start: 2020-11-01 | End: 2021-11-12

## 2021-04-05 NOTE — H&P (VIEW-ONLY)
"    PATIENT INFORMATION  Mercy Duran       - 1970    CHIEF COMPLAINT  Chief Complaint   Patient presents with   • Heartburn   • Abdominal Pain       HISTORY OF PRESENT ILLNESS  Did see Lo and had GERD/ GAsritis and was on Nexium and was Weaned off. Due to poor thyroid replacement but even off PPI she has continued to have issues.    Using daily AA- rolaids and Pepto-only a week of OTC Nexiuma dn her symptoms are daily and nightly and sour water brash and blopating and epigastric soreness and classic HB and mago vomiting \"Maribel\"    Taking so much Pepto her stools arer black and scant rectal bleeding, but also was on the books for screening colon but cancelled by COVID     Is on custom T3-4 mix from [facility]      REVIEWED PERTINENT RESULTS/ LABS  No results found for: CASEREPORT, FINALDX  Lab Results   Component Value Date    HGB 14.2 2020    MCV 89.9 2020     2020    ALT 14 2020    AST 31 2020    HGBA1C 5.2 2020    TRIG 94 2020      No results found.    REVIEW OF SYSTEMS  Review of Systems   Gastrointestinal: Positive for abdominal pain.        Reflux         ACTIVE PROBLEMS  Patient Active Problem List    Diagnosis    • EBV infection [B27.90]    • Hip dysplasia, congenital [Q65.89]    • Bilateral hip bursitis [M70.71, M70.72]    • Obesity (BMI 30.0-34.9) [E66.9]    • PILO (obstructive sleep apnea) [G47.33]    • Essential hypertension [I10]    • Atopic rhinitis [J30.9]    • Degeneration of intervertebral disc of mid-cervical region [M50.320]    • Edema [R60.9]    • Gastroesophageal reflux disease [K21.9]    • Hyperglycemia [R73.9]    • Hyperlipidemia [E78.5]    • Hypothyroidism [E03.9]    • Migraine [G43.909]    • Reactive airway disease [J45.909]    • Goiter [E04.9]    • Thyroid nodule [E04.1]          PAST MEDICAL HISTORY  Past Medical History:   Diagnosis Date   • ASCUS with positive high risk human papillomavirus of vagina    • ANTONINO I " (cervical intraepithelial neoplasia I)    • Dysmenorrhea    • Edema    • Danny Bell infection    • Fatigue    • Fibrocystic breast changes    • GERD (gastroesophageal reflux disease)    • Goiter    • H/O colposcopy with cervical biopsy    • Hashimoto's thyroiditis    • History of prior pregnancies      2 (total no.)   • HSV-1 (herpes simplex virus 1) infection    • Hypothyroidism    • Joint pain    • Left breast lump    • Mastalgia    • Menorrhagia    • Methicillin resistant Staphylococcus aureus infection    • MRSA (methicillin resistant staph aureus) culture positive    • PILO (obstructive sleep apnea)    • Pelvic pain in female    • Vaginal delivery          SURGICAL HISTORY  Past Surgical History:   Procedure Laterality Date   • CERVICAL DISCECTOMY ANTERIOR     •  SECTION     • DILATATION AND CURETTAGE     • ENDOMETRIAL ABLATION W/ NOVASURE     • HYSTERECTOMY     • LAPAROSCOPIC ASSISTED VAGINAL HYSTERECTOMY SALPINGO OOPHORECTOMY      LSO only - 3 cm benign serous cystadenoma   • LAPAROSCOPIC CHOLECYSTECTOMY     • SALPINGO OOPHORECTOMY Left    • TUBAL ABDOMINAL LIGATION           FAMILY HISTORY  Family History   Problem Relation Age of Onset   • Cancer Paternal Grandfather         Prostate   • Other Other         High Blood Pressure   • Hypertension Father    • Sleep apnea Father    • Lung cancer Paternal Aunt    • Esophageal cancer Paternal Uncle    • Pancreatic cancer Paternal Uncle         pt declines genetic testing    • No Known Problems Mother    • No Known Problems Sister    • No Known Problems Brother    • No Known Problems Daughter    • No Known Problems Son    • No Known Problems Maternal Grandmother    • No Known Problems Paternal Grandmother    • No Known Problems Maternal Aunt    • BRCA 1/2 Neg Hx    • Colon cancer Neg Hx    • Endometrial cancer Neg Hx    • Ovarian cancer Neg Hx          SOCIAL HISTORY  Social History     Occupational History   • Not on file   Tobacco Use   • Smoking  "status: Never Smoker   • Smokeless tobacco: Never Used   Vaping Use   • Vaping Use: Never used   Substance and Sexual Activity   • Alcohol use: Yes     Comment: rare   • Drug use: No   • Sexual activity: Not Currently     Partners: Male     Birth control/protection: Surgical     Comment: francine had renal cancer         CURRENT MEDICATIONS    Current Outpatient Medications:   •  furosemide (LASIX) 20 MG tablet, Take 20 mg by mouth Daily., Disp: , Rfl:   •  Potassium (Potassimin) 75 MG tablet, , Disp: , Rfl:   •  Ascorbic Acid (VITAMIN C ER) 1000 MG tablet controlled-release, , Disp: , Rfl:   •  cholecalciferol (VITAMIN D3) 1000 UNITS tablet, Take 1,000 Units by mouth Daily., Disp: , Rfl:   •  esomeprazole (nexIUM) 40 MG capsule, Take 1 capsule by mouth Daily., Disp: 90 capsule, Rfl: 3  •  famciclovir (FAMVIR) 250 MG tablet, Take 250 mg by mouth Daily., Disp: , Rfl:   •  Lysine 1000 MG tablet, Take  by mouth., Disp: , Rfl:   •  Semaglutide,0.25 or 0.5MG/DOS, (Ozempic, 0.25 or 0.5 MG/DOSE,) 2 MG/1.5ML solution pen-injector, , Disp: , Rfl:     ALLERGIES  Adhesive tape and Victoza [liraglutide]    VITALS  Vitals:    04/05/21 1541   Temp: 97.2 °F (36.2 °C)   TempSrc: Temporal   Weight: 115 kg (254 lb 3.2 oz)   Height: 170.2 cm (67.01\")       PHYSICAL EXAM  Debilities/Disabilities Identified: None  Emotional Behavior: Appropriate  Wt Readings from Last 3 Encounters:   04/05/21 115 kg (254 lb 3.2 oz)   08/06/20 103 kg (226 lb)   12/10/18 117 kg (259 lb)     Ht Readings from Last 1 Encounters:   04/05/21 170.2 cm (67.01\")     Body mass index is 39.8 kg/m².  Physical Exam  Constitutional:       Appearance: She is well-developed. She is not diaphoretic.   HENT:      Head: Normocephalic and atraumatic.   Eyes:      General: No scleral icterus.     Conjunctiva/sclera: Conjunctivae normal.      Pupils: Pupils are equal, round, and reactive to light.   Neck:      Thyroid: No thyromegaly.   Cardiovascular:      Rate and Rhythm: " Normal rate and regular rhythm.      Heart sounds: Normal heart sounds. No murmur heard.   No gallop.    Pulmonary:      Effort: Pulmonary effort is normal.      Breath sounds: Normal breath sounds. No wheezing or rales.   Abdominal:      General: Bowel sounds are normal. There is no distension or abdominal bruit.      Palpations: Abdomen is soft. There is no shifting dullness, fluid wave or mass.      Tenderness: There is generalized abdominal tenderness. There is no guarding. Negative signs include Maravilla's sign.      Hernia: There is no hernia in the ventral area.   Musculoskeletal:         General: Normal range of motion.      Cervical back: Normal range of motion and neck supple.   Lymphadenopathy:      Cervical: No cervical adenopathy.   Skin:     General: Skin is warm and dry.      Findings: No erythema or rash.   Neurological:      Mental Status: She is alert and oriented to person, place, and time.   Psychiatric:         Mood and Affect: Mood normal.         Behavior: Behavior normal.         CLINICAL DATA REVIEWED   reviewed previous lab results and integrated with today's visit, reviewed notes from other physicians and/or last GI encounter, reviewed previous endoscopy results and available photos, reviewed surgical pathology results from previous biopsies    ASSESSMENT  Diagnoses and all orders for this visit:    Generalized abdominal pain  -     Case Request; Standing  -     Case Request    Encounter for screening for malignant neoplasm of colon  -     Case Request; Standing  -     Case Request    Hypothyroidism due to acquired atrophy of thyroid    Gastroesophageal reflux disease with esophagitis without hemorrhage  -     Case Request; Standing  -     Case Request    Other orders  -     Potassium (Potassimin) 75 MG tablet  -     furosemide (LASIX) 20 MG tablet; Take 20 mg by mouth Daily.  -     famciclovir (FAMVIR) 250 MG tablet; Take 250 mg by mouth Daily.  -     Follow Anesthesia Guidelines /  Protocol; Future  -     Obtain Informed Consent; Standing  -     esomeprazole (nexIUM) 40 MG capsule; Take 1 capsule by mouth Daily.          PLAN  Return if symptoms worsen or fail to improve.    I have discussed the above plan with the patient.  They verbalize understanding and are in agreement with the plan.  They have been advised to contact the office for any questions, concerns, or changes related to their health.

## 2021-04-05 NOTE — PROGRESS NOTES
"    PATIENT INFORMATION  Mercy Duran       - 1970    CHIEF COMPLAINT  Chief Complaint   Patient presents with   • Heartburn   • Abdominal Pain       HISTORY OF PRESENT ILLNESS  Did see Lo and had GERD/ GAsritis and was on Nexium and was Weaned off. Due to poor thyroid replacement but even off PPI she has continued to have issues.    Using daily AA- rolaids and Pepto-only a week of OTC Nexiuma dn her symptoms are daily and nightly and sour water brash and blopating and epigastric soreness and classic HB and mago vomiting \"Maribel\"    Taking so much Pepto her stools arer black and scant rectal bleeding, but also was on the books for screening colon but cancelled by COVID     Is on custom T3-4 mix from [facility]      REVIEWED PERTINENT RESULTS/ LABS  No results found for: CASEREPORT, FINALDX  Lab Results   Component Value Date    HGB 14.2 2020    MCV 89.9 2020     2020    ALT 14 2020    AST 31 2020    HGBA1C 5.2 2020    TRIG 94 2020      No results found.    REVIEW OF SYSTEMS  Review of Systems   Gastrointestinal: Positive for abdominal pain.        Reflux         ACTIVE PROBLEMS  Patient Active Problem List    Diagnosis    • EBV infection [B27.90]    • Hip dysplasia, congenital [Q65.89]    • Bilateral hip bursitis [M70.71, M70.72]    • Obesity (BMI 30.0-34.9) [E66.9]    • PILO (obstructive sleep apnea) [G47.33]    • Essential hypertension [I10]    • Atopic rhinitis [J30.9]    • Degeneration of intervertebral disc of mid-cervical region [M50.320]    • Edema [R60.9]    • Gastroesophageal reflux disease [K21.9]    • Hyperglycemia [R73.9]    • Hyperlipidemia [E78.5]    • Hypothyroidism [E03.9]    • Migraine [G43.909]    • Reactive airway disease [J45.909]    • Goiter [E04.9]    • Thyroid nodule [E04.1]          PAST MEDICAL HISTORY  Past Medical History:   Diagnosis Date   • ASCUS with positive high risk human papillomavirus of vagina    • ANTONINO I " (cervical intraepithelial neoplasia I)    • Dysmenorrhea    • Edema    • Danny Bell infection    • Fatigue    • Fibrocystic breast changes    • GERD (gastroesophageal reflux disease)    • Goiter    • H/O colposcopy with cervical biopsy    • Hashimoto's thyroiditis    • History of prior pregnancies      2 (total no.)   • HSV-1 (herpes simplex virus 1) infection    • Hypothyroidism    • Joint pain    • Left breast lump    • Mastalgia    • Menorrhagia    • Methicillin resistant Staphylococcus aureus infection    • MRSA (methicillin resistant staph aureus) culture positive    • PILO (obstructive sleep apnea)    • Pelvic pain in female    • Vaginal delivery          SURGICAL HISTORY  Past Surgical History:   Procedure Laterality Date   • CERVICAL DISCECTOMY ANTERIOR     •  SECTION     • DILATATION AND CURETTAGE     • ENDOMETRIAL ABLATION W/ NOVASURE     • HYSTERECTOMY     • LAPAROSCOPIC ASSISTED VAGINAL HYSTERECTOMY SALPINGO OOPHORECTOMY      LSO only - 3 cm benign serous cystadenoma   • LAPAROSCOPIC CHOLECYSTECTOMY     • SALPINGO OOPHORECTOMY Left    • TUBAL ABDOMINAL LIGATION           FAMILY HISTORY  Family History   Problem Relation Age of Onset   • Cancer Paternal Grandfather         Prostate   • Other Other         High Blood Pressure   • Hypertension Father    • Sleep apnea Father    • Lung cancer Paternal Aunt    • Esophageal cancer Paternal Uncle    • Pancreatic cancer Paternal Uncle         pt declines genetic testing    • No Known Problems Mother    • No Known Problems Sister    • No Known Problems Brother    • No Known Problems Daughter    • No Known Problems Son    • No Known Problems Maternal Grandmother    • No Known Problems Paternal Grandmother    • No Known Problems Maternal Aunt    • BRCA 1/2 Neg Hx    • Colon cancer Neg Hx    • Endometrial cancer Neg Hx    • Ovarian cancer Neg Hx          SOCIAL HISTORY  Social History     Occupational History   • Not on file   Tobacco Use   • Smoking  "status: Never Smoker   • Smokeless tobacco: Never Used   Vaping Use   • Vaping Use: Never used   Substance and Sexual Activity   • Alcohol use: Yes     Comment: rare   • Drug use: No   • Sexual activity: Not Currently     Partners: Male     Birth control/protection: Surgical     Comment: francine had renal cancer         CURRENT MEDICATIONS    Current Outpatient Medications:   •  furosemide (LASIX) 20 MG tablet, Take 20 mg by mouth Daily., Disp: , Rfl:   •  Potassium (Potassimin) 75 MG tablet, , Disp: , Rfl:   •  Ascorbic Acid (VITAMIN C ER) 1000 MG tablet controlled-release, , Disp: , Rfl:   •  cholecalciferol (VITAMIN D3) 1000 UNITS tablet, Take 1,000 Units by mouth Daily., Disp: , Rfl:   •  esomeprazole (nexIUM) 40 MG capsule, Take 1 capsule by mouth Daily., Disp: 90 capsule, Rfl: 3  •  famciclovir (FAMVIR) 250 MG tablet, Take 250 mg by mouth Daily., Disp: , Rfl:   •  Lysine 1000 MG tablet, Take  by mouth., Disp: , Rfl:   •  Semaglutide,0.25 or 0.5MG/DOS, (Ozempic, 0.25 or 0.5 MG/DOSE,) 2 MG/1.5ML solution pen-injector, , Disp: , Rfl:     ALLERGIES  Adhesive tape and Victoza [liraglutide]    VITALS  Vitals:    04/05/21 1541   Temp: 97.2 °F (36.2 °C)   TempSrc: Temporal   Weight: 115 kg (254 lb 3.2 oz)   Height: 170.2 cm (67.01\")       PHYSICAL EXAM  Debilities/Disabilities Identified: None  Emotional Behavior: Appropriate  Wt Readings from Last 3 Encounters:   04/05/21 115 kg (254 lb 3.2 oz)   08/06/20 103 kg (226 lb)   12/10/18 117 kg (259 lb)     Ht Readings from Last 1 Encounters:   04/05/21 170.2 cm (67.01\")     Body mass index is 39.8 kg/m².  Physical Exam  Constitutional:       Appearance: She is well-developed. She is not diaphoretic.   HENT:      Head: Normocephalic and atraumatic.   Eyes:      General: No scleral icterus.     Conjunctiva/sclera: Conjunctivae normal.      Pupils: Pupils are equal, round, and reactive to light.   Neck:      Thyroid: No thyromegaly.   Cardiovascular:      Rate and Rhythm: " Normal rate and regular rhythm.      Heart sounds: Normal heart sounds. No murmur heard.   No gallop.    Pulmonary:      Effort: Pulmonary effort is normal.      Breath sounds: Normal breath sounds. No wheezing or rales.   Abdominal:      General: Bowel sounds are normal. There is no distension or abdominal bruit.      Palpations: Abdomen is soft. There is no shifting dullness, fluid wave or mass.      Tenderness: There is generalized abdominal tenderness. There is no guarding. Negative signs include Maravilla's sign.      Hernia: There is no hernia in the ventral area.   Musculoskeletal:         General: Normal range of motion.      Cervical back: Normal range of motion and neck supple.   Lymphadenopathy:      Cervical: No cervical adenopathy.   Skin:     General: Skin is warm and dry.      Findings: No erythema or rash.   Neurological:      Mental Status: She is alert and oriented to person, place, and time.   Psychiatric:         Mood and Affect: Mood normal.         Behavior: Behavior normal.         CLINICAL DATA REVIEWED   reviewed previous lab results and integrated with today's visit, reviewed notes from other physicians and/or last GI encounter, reviewed previous endoscopy results and available photos, reviewed surgical pathology results from previous biopsies    ASSESSMENT  Diagnoses and all orders for this visit:    Generalized abdominal pain  -     Case Request; Standing  -     Case Request    Encounter for screening for malignant neoplasm of colon  -     Case Request; Standing  -     Case Request    Hypothyroidism due to acquired atrophy of thyroid    Gastroesophageal reflux disease with esophagitis without hemorrhage  -     Case Request; Standing  -     Case Request    Other orders  -     Potassium (Potassimin) 75 MG tablet  -     furosemide (LASIX) 20 MG tablet; Take 20 mg by mouth Daily.  -     famciclovir (FAMVIR) 250 MG tablet; Take 250 mg by mouth Daily.  -     Follow Anesthesia Guidelines /  Protocol; Future  -     Obtain Informed Consent; Standing  -     esomeprazole (nexIUM) 40 MG capsule; Take 1 capsule by mouth Daily.          PLAN  Return if symptoms worsen or fail to improve.    I have discussed the above plan with the patient.  They verbalize understanding and are in agreement with the plan.  They have been advised to contact the office for any questions, concerns, or changes related to their health.

## 2021-04-07 ENCOUNTER — TRANSCRIBE ORDERS (OUTPATIENT)
Dept: LAB | Facility: SURGERY CENTER | Age: 51
End: 2021-04-07

## 2021-04-07 ENCOUNTER — PREP FOR SURGERY (OUTPATIENT)
Dept: SURGERY | Facility: SURGERY CENTER | Age: 51
End: 2021-04-07

## 2021-04-07 DIAGNOSIS — R10.13 DYSPEPSIA: ICD-10-CM

## 2021-04-07 DIAGNOSIS — Z01.818 OTHER SPECIFIED PRE-OPERATIVE EXAMINATION: Primary | ICD-10-CM

## 2021-04-07 DIAGNOSIS — Z12.11 ENCOUNTER FOR SCREENING FOR MALIGNANT NEOPLASM OF COLON: Primary | ICD-10-CM

## 2021-04-07 DIAGNOSIS — R10.84 GENERALIZED ABDOMINAL PAIN: ICD-10-CM

## 2021-04-07 DIAGNOSIS — K21.00 GASTROESOPHAGEAL REFLUX DISEASE WITH ESOPHAGITIS WITHOUT HEMORRHAGE: ICD-10-CM

## 2021-04-07 DIAGNOSIS — R10.13 EPIGASTRIC PAIN: ICD-10-CM

## 2021-04-14 ENCOUNTER — LAB (OUTPATIENT)
Dept: LAB | Facility: HOSPITAL | Age: 51
End: 2021-04-14

## 2021-04-14 DIAGNOSIS — U07.1 COVID-19: ICD-10-CM

## 2021-04-14 LAB — SARS-COV-2 RNA PNL SPEC NAA+PROBE: NOT DETECTED

## 2021-04-14 PROCEDURE — 87635 SARS-COV-2 COVID-19 AMP PRB: CPT | Performed by: OBSTETRICS & GYNECOLOGY

## 2021-04-14 PROCEDURE — C9803 HOPD COVID-19 SPEC COLLECT: HCPCS

## 2021-04-16 ENCOUNTER — HOSPITAL ENCOUNTER (OUTPATIENT)
Facility: SURGERY CENTER | Age: 51
Setting detail: HOSPITAL OUTPATIENT SURGERY
Discharge: HOME | End: 2021-04-16
Attending: INTERNAL MEDICINE | Admitting: INTERNAL MEDICINE

## 2021-04-16 ENCOUNTER — ANESTHESIA (OUTPATIENT)
Dept: SURGERY | Facility: SURGERY CENTER | Age: 51
End: 2021-04-16

## 2021-04-16 ENCOUNTER — ANESTHESIA EVENT (OUTPATIENT)
Dept: SURGERY | Facility: SURGERY CENTER | Age: 51
End: 2021-04-16

## 2021-04-16 VITALS
HEIGHT: 67 IN | HEART RATE: 71 BPM | WEIGHT: 251.8 LBS | OXYGEN SATURATION: 98 % | SYSTOLIC BLOOD PRESSURE: 131 MMHG | TEMPERATURE: 97.1 F | DIASTOLIC BLOOD PRESSURE: 72 MMHG | RESPIRATION RATE: 18 BRPM | BODY MASS INDEX: 39.52 KG/M2

## 2021-04-16 DIAGNOSIS — R10.13 EPIGASTRIC PAIN: ICD-10-CM

## 2021-04-16 DIAGNOSIS — K21.00 GASTROESOPHAGEAL REFLUX DISEASE WITH ESOPHAGITIS WITHOUT HEMORRHAGE: ICD-10-CM

## 2021-04-16 DIAGNOSIS — Z12.11 ENCOUNTER FOR SCREENING FOR MALIGNANT NEOPLASM OF COLON: ICD-10-CM

## 2021-04-16 DIAGNOSIS — R10.84 GENERALIZED ABDOMINAL PAIN: ICD-10-CM

## 2021-04-16 DIAGNOSIS — R10.13 DYSPEPSIA: ICD-10-CM

## 2021-04-16 LAB — GLUCOSE BLDC GLUCOMTR-MCNC: 108 MG/DL (ref 70–130)

## 2021-04-16 PROCEDURE — 45378 DIAGNOSTIC COLONOSCOPY: CPT | Performed by: INTERNAL MEDICINE

## 2021-04-16 PROCEDURE — 43239 EGD BIOPSY SINGLE/MULTIPLE: CPT | Performed by: INTERNAL MEDICINE

## 2021-04-16 PROCEDURE — 0DB48ZX EXCISION OF ESOPHAGOGASTRIC JUNCTION, VIA NATURAL OR ARTIFICIAL OPENING ENDOSCOPIC, DIAGNOSTIC: ICD-10-PCS | Performed by: INTERNAL MEDICINE

## 2021-04-16 PROCEDURE — 25010000002 PROPOFOL 10 MG/ML EMULSION: Performed by: ANESTHESIOLOGY

## 2021-04-16 PROCEDURE — 88305 TISSUE EXAM BY PATHOLOGIST: CPT | Performed by: INTERNAL MEDICINE

## 2021-04-16 PROCEDURE — 0DB68ZX EXCISION OF STOMACH, VIA NATURAL OR ARTIFICIAL OPENING ENDOSCOPIC, DIAGNOSTIC: ICD-10-PCS | Performed by: INTERNAL MEDICINE

## 2021-04-16 PROCEDURE — 0DJD8ZZ INSPECTION OF LOWER INTESTINAL TRACT, VIA NATURAL OR ARTIFICIAL OPENING ENDOSCOPIC: ICD-10-PCS | Performed by: INTERNAL MEDICINE

## 2021-04-16 RX ORDER — SODIUM CHLORIDE, SODIUM LACTATE, POTASSIUM CHLORIDE, CALCIUM CHLORIDE 600; 310; 30; 20 MG/100ML; MG/100ML; MG/100ML; MG/100ML
1000 INJECTION, SOLUTION INTRAVENOUS CONTINUOUS
Status: DISCONTINUED | OUTPATIENT
Start: 2021-04-16 | End: 2021-04-16 | Stop reason: HOSPADM

## 2021-04-16 RX ORDER — LIDOCAINE HYDROCHLORIDE 10 MG/ML
0.5 INJECTION, SOLUTION INFILTRATION; PERINEURAL ONCE AS NEEDED
Status: DISCONTINUED | OUTPATIENT
Start: 2021-04-16 | End: 2021-04-16 | Stop reason: HOSPADM

## 2021-04-16 RX ORDER — LIDOCAINE HYDROCHLORIDE 20 MG/ML
INJECTION, SOLUTION INFILTRATION; PERINEURAL AS NEEDED
Status: DISCONTINUED | OUTPATIENT
Start: 2021-04-16 | End: 2021-04-16 | Stop reason: SURG

## 2021-04-16 RX ORDER — SODIUM CHLORIDE 0.9 % (FLUSH) 0.9 %
10 SYRINGE (ML) INJECTION AS NEEDED
Status: DISCONTINUED | OUTPATIENT
Start: 2021-04-16 | End: 2021-04-16 | Stop reason: HOSPADM

## 2021-04-16 RX ORDER — PROPOFOL 10 MG/ML
VIAL (ML) INTRAVENOUS AS NEEDED
Status: DISCONTINUED | OUTPATIENT
Start: 2021-04-16 | End: 2021-04-16 | Stop reason: SURG

## 2021-04-16 RX ADMIN — PROPOFOL 20 MG: 10 INJECTION, EMULSION INTRAVENOUS at 14:59

## 2021-04-16 RX ADMIN — PROPOFOL 200 MG: 10 INJECTION, EMULSION INTRAVENOUS at 14:57

## 2021-04-16 RX ADMIN — SODIUM CHLORIDE, POTASSIUM CHLORIDE, SODIUM LACTATE AND CALCIUM CHLORIDE 1000 ML: 600; 310; 30; 20 INJECTION, SOLUTION INTRAVENOUS at 13:48

## 2021-04-16 RX ADMIN — PROPOFOL 20 MG: 10 INJECTION, EMULSION INTRAVENOUS at 15:02

## 2021-04-16 RX ADMIN — PROPOFOL 20 MG: 10 INJECTION, EMULSION INTRAVENOUS at 15:00

## 2021-04-16 RX ADMIN — PROPOFOL 20 MG: 10 INJECTION, EMULSION INTRAVENOUS at 15:01

## 2021-04-16 RX ADMIN — PROPOFOL 20 MG: 10 INJECTION, EMULSION INTRAVENOUS at 15:03

## 2021-04-16 RX ADMIN — PROPOFOL 20 MG: 10 INJECTION, EMULSION INTRAVENOUS at 15:04

## 2021-04-16 RX ADMIN — LIDOCAINE HYDROCHLORIDE 60 MG: 20 INJECTION, SOLUTION INFILTRATION; PERINEURAL at 14:56

## 2021-04-16 RX ADMIN — PROPOFOL 20 MG: 10 INJECTION, EMULSION INTRAVENOUS at 14:58

## 2021-04-16 RX ADMIN — PROPOFOL 180 MCG/KG/MIN: 10 INJECTION, EMULSION INTRAVENOUS at 15:07

## 2021-04-16 RX ADMIN — PROPOFOL 30 MG: 10 INJECTION, EMULSION INTRAVENOUS at 15:09

## 2021-04-16 RX ADMIN — PROPOFOL 30 MG: 10 INJECTION, EMULSION INTRAVENOUS at 15:11

## 2021-04-16 NOTE — ANESTHESIA POSTPROCEDURE EVALUATION
"Patient: Mercy Duran    Procedure Summary     Date: 04/16/21 Room / Location: [facility]    Anesthesia Start: 1443 Anesthesia Stop: 1530    Procedures:       COLONOSCOPY (Left )      ESOPHAGOGASTRODUODENOSCOPY (Left ) Diagnosis:       Encounter for screening for malignant neoplasm of colon      Generalized abdominal pain      Gastroesophageal reflux disease with esophagitis without hemorrhage      Epigastric pain      Dyspepsia      Reflux esophagitis      Gastritis      Diverticulosis large intestine w/o perforation or abscess w/bleeding      (Encounter for screening for malignant neoplasm of colon [Z12.11])      (Generalized abdominal pain [R10.84])      (Gastroesophageal reflux disease with esophagitis without hemorrhage [K21.00])      (Epigastric pain [R10.13])      (Dyspepsia [R10.13])    Surgeons: Zane Arevalo MD Provider: Dionte Parada MD    Anesthesia Type: MAC ASA Status: 3          Anesthesia Type: MAC    Vitals  Vitals Value Taken Time   /73 04/16/21 1530   Temp 36.2 °C (97.1 °F) 04/16/21 1530   Pulse 76 04/16/21 1530   Resp 16 04/16/21 1530   SpO2 100 % 04/16/21 1530           Post Anesthesia Care and Evaluation    Patient location during evaluation: bedside  Patient participation: complete - patient participated  Level of consciousness: awake and alert  Pain management: adequate  Airway patency: patent  Anesthetic complications: No anesthetic complications    Cardiovascular status: acceptable  Respiratory status: acceptable  Hydration status: acceptable    Comments: /73 (BP Location: Left arm, Patient Position: Lying)   Pulse 76   Temp 36.2 °C (97.1 °F) (Temporal)   Resp 16   Ht 170.2 cm (67\")   Wt 114 kg (251 lb 12.8 oz)   SpO2 100%   BMI 39.44 kg/m²       "

## 2021-04-16 NOTE — BRIEF OP NOTE
COLONOSCOPY, ESOPHAGOGASTRODUODENOSCOPY  Progress Note    Mercy uDran  4/16/2021    Pre-op Diagnosis:   Encounter for screening for malignant neoplasm of colon [Z12.11]  Generalized abdominal pain [R10.84]  Gastroesophageal reflux disease with esophagitis without hemorrhage [K21.00]  Epigastric pain [R10.13]  Dyspepsia [R10.13]       Post-Op Diagnosis Codes:     * Encounter for screening for malignant neoplasm of colon [Z12.11]     * Generalized abdominal pain [R10.84]     * Gastroesophageal reflux disease with esophagitis without hemorrhage [K21.00]     * Epigastric pain [R10.13]     * Dyspepsia [R10.13]     * Reflux esophagitis [K21.00]     * Gastritis [K29.70]     * Diverticulosis large intestine w/o perforation or abscess w/bleeding [K57.31]    Procedure/CPT® Codes:        Procedure(s):  COLONOSCOPY  ESOPHAGOGASTRODUODENOSCOPY    Surgeon(s):  Zane Arevalo MD    Anesthesia: Monitored Anesthesia Care    Staff:   Scrub Person: Ramya Seymour  Endo Nurse: Sarah Fernández RN         Estimated Blood Loss: none    Urine Voided: * No values recorded between 4/16/2021  2:43 PM and 4/16/2021  3:26 PM *    Specimens:                Specimens     ID Source Type Tests Collected By Collected At Frozen?    A Gastric, Antrum Tissue · TISSUE PATHOLOGY EXAM   Zane Arevalo MD 4/16/21 1501 No    Description: gastric bioipsy    B Esophagus, Distal Tissue · TISSUE PATHOLOGY EXAM   Zane Arevalo MD 4/16/21 1505 No    Description: distal esophagus biopsy                Drains: * No LDAs found *    Findings: Normal Duodenum  Mild gastritis-Biopsy  Reflux Esophagitis-Biopsy    Colon to TI good Prep  Diverticulosis-Thru-out    Complications: None          Zane Arevalo MD     Date: 4/16/2021  Time: 15:27 EDT

## 2021-04-16 NOTE — ANESTHESIA PREPROCEDURE EVALUATION
Anesthesia Evaluation     Patient summary reviewed   NPO Solid Status: > 8 hours  NPO Liquid Status: > 2 hours           Airway   Mallampati: I  TM distance: >3 FB  Neck ROM: full  Dental      Pulmonary     breath sounds clear to auscultation  (+) sleep apnea (needs CPAP but does not use one),   (-) shortness of breath, not a smoker  Cardiovascular   Exercise tolerance: good (4-7 METS)    Rhythm: regular  Rate: normal    (+) hypertension (denies), hyperlipidemia,   (-) angina, MASTERS      Neuro/Psych  (+) headaches,     GI/Hepatic/Renal/Endo    (+)  GERD,  thyroid problem hypothyroidism    Musculoskeletal     (+) neck pain,   Abdominal    Substance History      OB/GYN          Other   arthritis,    history of cancer                  Anesthesia Plan    ASA 3     MAC   (MAC anesthesia discussed with patient and/or patient representative. Risks (including but not limited to intra-op awareness), benefits, and alternatives were discussed. Understanding was voiced with an agreement to proceed with a MAC technique and General as a backup option.   )  intravenous induction     Anesthetic plan, all risks, benefits, and alternatives have been provided, discussed and informed consent has been obtained with: patient.

## 2021-04-16 NOTE — INTERVAL H&P NOTE
"Vital Signs  /77 (BP Location: Left arm, Patient Position: Sitting)   Pulse 86   Temp 96.6 °F (35.9 °C) (Tympanic)   Resp 16   Ht 170.2 cm (67\")   Wt 114 kg (251 lb 12.8 oz)   SpO2 99%   BMI 39.44 kg/m²     H&P reviewed. The patient was examined and there are no changes to the H&P.        "

## 2021-04-19 LAB
CYTO UR: NORMAL
LAB AP CASE REPORT: NORMAL
PATH REPORT.FINAL DX SPEC: NORMAL
PATH REPORT.GROSS SPEC: NORMAL

## 2021-04-26 ENCOUNTER — OFFICE VISIT (OUTPATIENT)
Dept: ORTHOPEDIC SURGERY | Facility: CLINIC | Age: 51
End: 2021-04-26

## 2021-04-26 VITALS — BODY MASS INDEX: 39.39 KG/M2 | HEIGHT: 67 IN | WEIGHT: 251 LBS | TEMPERATURE: 97.8 F

## 2021-04-26 DIAGNOSIS — M70.61 GREATER TROCHANTERIC BURSITIS OF BOTH HIPS: Primary | ICD-10-CM

## 2021-04-26 DIAGNOSIS — M70.62 GREATER TROCHANTERIC BURSITIS OF BOTH HIPS: Primary | ICD-10-CM

## 2021-04-26 PROCEDURE — 99203 OFFICE O/P NEW LOW 30 MIN: CPT | Performed by: ORTHOPAEDIC SURGERY

## 2021-04-26 PROCEDURE — 73521 X-RAY EXAM HIPS BI 2 VIEWS: CPT | Performed by: ORTHOPAEDIC SURGERY

## 2021-04-26 NOTE — PROGRESS NOTES
Patient ID: Mercy Duran     Chief Complaint:    Chief Complaint   Patient presents with   • Right Hip - Establish Care, Pain   • Left Hip - Establish Care, Pain        HPI:    Mercy Duran is a 50 y.o. female who presents for bilateral hip pain.  Patient states she has lateral hip pain she has had bursitis in the past most notably she said in 2017 had injections and symptoms resolved.  Current symptoms are quite similar.  Denies any deep groin pain.  Has difficulties sleeping at night or on the sides because of the pain.  No recent trauma.  Denies distal numbness tingling.    ROS:  Constitutional:  Denies fever, shaking or chills   Eyes:  Denies change in visual acuity   HENT:  Denies nasal congestion or sore throat   Respiratory:  Denies cough or shortness of breath   Cardiovascular:  Denies chest pain or edema   GI:  Denies abdominal pain, nausea, vomiting, bloody stools or diarrhea   Musculoskeletal:  Denies numbness tingling or loss of motor function except as outlined above in history of present illness.  Integument:  Denies rash, lesion or ulceration   Neurologic:  Denies headache or focal weakness  Lymphatics: No lymphadenopathy, no lymphedema      All other systems reviewed and are negative     MEDICAL HISTORY    Past Medical History:   Diagnosis Date   • ASCUS with positive high risk human papillomavirus of vagina    • ANTONINO I (cervical intraepithelial neoplasia I)    • Dysmenorrhea    • Edema    • Danny Bell infection    • Fatigue    • Fibrocystic breast changes    • GERD (gastroesophageal reflux disease)    • Goiter    • H/O colposcopy with cervical biopsy    • Hashimoto's thyroiditis    • History of prior pregnancies      2 (total no.)   • HSV-1 (herpes simplex virus 1) infection    • Hypothyroidism    • Joint pain    • Left breast lump    • Mastalgia    • Menorrhagia    • Methicillin resistant Staphylococcus aureus infection    • MRSA (methicillin resistant staph aureus) culture positive     • PILO (obstructive sleep apnea)    • Pelvic pain in female    • Vaginal delivery        SURGICAL HISTORY     has a past surgical history that includes  section; Hysterectomy; Cervical discectomy; Tubal ligation; Laparoscopic cholecystectomy; Dilation and curettage of uterus; Endometrial ablation w/ novasure; Salpingoophorectomy (Left); laparoscopic assisted vaginal hysterectomy salpingo oophorectomy; Colonoscopy (Left, 2021); and Esophagogastroduodenoscopy (Left, 2021).    FAMILY HISTORY    Family History   Problem Relation Age of Onset   • Cancer Paternal Grandfather         Prostate   • Other Other         High Blood Pressure   • Hypertension Father    • Sleep apnea Father    • Lung cancer Paternal Aunt    • Esophageal cancer Paternal Uncle    • Pancreatic cancer Paternal Uncle         pt declines genetic testing    • No Known Problems Mother    • No Known Problems Sister    • No Known Problems Brother    • No Known Problems Daughter    • No Known Problems Son    • No Known Problems Maternal Grandmother    • No Known Problems Paternal Grandmother    • No Known Problems Maternal Aunt    • BRCA 1/2 Neg Hx    • Colon cancer Neg Hx    • Endometrial cancer Neg Hx    • Ovarian cancer Neg Hx        SOCIAL HISTORY    Social History     Socioeconomic History   • Marital status:      Spouse name: Not on file   • Number of children: 5   • Years of education: Not on file   • Highest education level: Not on file   Tobacco Use   • Smoking status: Never Smoker   • Smokeless tobacco: Never Used   Vaping Use   • Vaping Use: Never used   Substance and Sexual Activity   • Alcohol use: Yes     Comment: rare   • Drug use: No   • Sexual activity: Not Currently     Partners: Male     Birth control/protection: Surgical     Comment: francine had renal cancer       ALLERGIES    Allergies   Allergen Reactions   • Adhesive Tape Other (See Comments)     Breaks out, raw skin--even after short time   • Victoza  "[Liraglutide] GI Intolerance     Colitis by CT       MEDICATIONS    Current Outpatient Medications   Medication Sig Dispense Refill   • Ascorbic Acid (VITAMIN C ER) 1000 MG tablet controlled-release      • cholecalciferol (VITAMIN D3) 1000 UNITS tablet Take 1,000 Units by mouth Daily.     • esomeprazole (nexIUM) 40 MG capsule Take 1 capsule by mouth Daily. 90 capsule 3   • famciclovir (FAMVIR) 250 MG tablet Take 250 mg by mouth Daily.     • furosemide (LASIX) 20 MG tablet Take 20 mg by mouth Daily.     • Lysine 1000 MG tablet Take  by mouth.     • Potassium (Potassimin) 75 MG tablet      • Semaglutide,0.25 or 0.5MG/DOS, (Ozempic, 0.25 or 0.5 MG/DOSE,) 2 MG/1.5ML solution pen-injector        No current facility-administered medications for this visit.         Physical Exam:     Vital Signs:  Temp 97.8 °F (36.6 °C)   Ht 170.2 cm (67\")   Wt 114 kg (251 lb)   BMI 39.31 kg/m²   Constitutional: Awake alert and oriented x3, well developed, well nourished, no acute distress, non-toxic appearance.  HENT:  Normocephalic, Atraumatic, Bilateral external ears normal, Oropharynx moist, No oral exudates, Nose normal.   Respiratory:  No respiratory distress, No wheezing  Vascular:  Brisk cap refill, Intact distal pulses, No cyanosis, all compartments soft with no signs or symptoms of compartment syndrome or DVT.  Neurologic: Sensation grossly intact to light touch throughout the involved extremity and bilaterally symmetric, deep tendon reflexes are 2+ and bilaterally symmetric, No focal deficits noted.   Neck:  Normal range of motion, No tenderness, Supple, Negative Spurlings.  Integument: Well hydrated, no rash, warm, dry, no lesions or ulceration.   Lymphatics: No obvious lymphadenopathy, No lymphedema  Musculoskeletal:  bilateral hip exam shows none muscle atrophy, no effusion.     Gait: normal    Capillary refill normal. Dorsalis Pedis: 2+  Posterior Tibial: 2+    Light touch  intact in bilateral lower extremities. Grossly " intact motor function throughout lower extremity.     Palpation: Point tenderness over the Greater Trochanteric region and laterally along the thigh.    All compartments soft with no signs DVT or compartment syndrome.    ROM:   Bilateral hip range of motion full painless.  Hip signs:  Log-roll: negative  Stinchfield: negative       Diagnostic Studies:     Imaging was personally and individually reviewed and discussed at length with the patient:    Three views of the bilateral hip(s) were taken in the office today, including AP pelvis and lateral views to evaluate the patient's complaint.  There is mild joint space narrowing with mild osteophyte formation.  There is no fracture or dislocation.  No periosteal reactions or medullary lesions are seen.      Comparison films : not available              Assessment:     Bilateral greater trochanteric bursitis      Plan:         We discussed at length with the patient regarding office findings today. We discussed the pathology and anatomy related to the symptoms. We discussed conservative interventions for this condition, including anti-inflammatories, Physical Therapy, Rest, Activity Modification and Steroid Injection. We will proceed with anti-inflammatories and formal physical therapy. All questions were answered, the patient understands and agrees with the plan.  Plan for follow up as needed.

## 2021-11-02 ENCOUNTER — APPOINTMENT (OUTPATIENT)
Dept: WOMENS IMAGING | Facility: HOSPITAL | Age: 51
End: 2021-11-02

## 2021-11-02 PROCEDURE — 77063 BREAST TOMOSYNTHESIS BI: CPT | Performed by: RADIOLOGY

## 2021-11-02 PROCEDURE — 77067 SCR MAMMO BI INCL CAD: CPT | Performed by: RADIOLOGY

## 2021-12-18 ENCOUNTER — TELEMEDICINE (OUTPATIENT)
Dept: FAMILY MEDICINE CLINIC | Facility: TELEHEALTH | Age: 51
End: 2021-12-18

## 2021-12-18 DIAGNOSIS — U07.1 COVID-19: Primary | ICD-10-CM

## 2021-12-18 PROCEDURE — 99213 OFFICE O/P EST LOW 20 MIN: CPT | Performed by: NURSE PRACTITIONER

## 2021-12-18 NOTE — PATIENT INSTRUCTIONS
Go to the nearest [facility] for your Covid-19 test. Wear a mask. When you arrive, notify the staff that you have done a virtual visit and have a covid test ordered. You will not need to be seen again by a provider. You will be notified in 1-5 days about the results of your test, by telephone call from a blocked cell number, and via [employer]. Please quarantine for 10 days since symptoms first appeared, make sure it has also been at least 24 hours without fever without the use of fever-reducing medications AND that other symptoms of Covid-19 are improving, especially cough and shortness of breath.          How to Quarantine at Home  Information for Patients and Families    These instructions are for people with confirmed or suspected COVID-19 who do not need to be hospitalized and those with confirmed COVID-19 who were hospitalized and discharged to care for themselves at home.    If you were tested through the Health Department  The Health Department will monitor your wellbeing.  If it is determined that you do not need to be hospitalized and can be isolated at home, you will be monitored by staff from your local or state health department.     If you were tested through a Commercial [facility]  You will need to monitor yourself and report changes in your symptoms to your doctor.  See the section below called Monitor Your Symptoms.    Follow these steps until a healthcare provider or local or state health department says you can return to your normal activities.    Stay home except to get medical care  • Restrict activities outside your home, except for getting medical care.   • Do not go to work, school, or public areas.   • Avoid using public transportation, ride-sharing, or taxis.    Separate yourself from other people and animals in your home  People  As much as possible, you should stay in a specific room and away from other people in your home. Also, you should use a separate bathroom, if  available.    Animals  You should restrict contact with pets and other animals while you are sick with COVID-19, just like you would around other people. When possible, have another member of your household care for your animals while you are sick. If you are sick with COVID-19, avoid contact with your pet, including petting, snuggling, being kissed or licked, and sharing food. If you must care for your pet or be around animals while you are sick, wash your hands before and after you interact with pets and wear a facemask. See COVID-19 and Animals for more information.    Call ahead before visiting your doctor  If you have a medical appointment, call the healthcare provider and tell them that you have or may have COVID-19. This information will help the healthcare provider’s office take steps to keep other people from getting infected or exposed.    Wear a facemask  You should wear a facemask when you are around other people (e.g., sharing a room or vehicle) or pets and before you enter a healthcare provider’s office.     If you are not able to wear a facemask (for example, because it causes trouble breathing), then people who live with you should not stay in the same room with you, or they should wear a facemask if they enter your room.    Cover your coughs and sneezes  • Cover your mouth and nose with a tissue when you cough or sneeze.   • Throw used tissues in a lined trash can.   • Immediately wash your hands with soap and water for at least 20 seconds or, if soap and water are not available, clean your hands with an alcohol-based hand  that contains at least 60% alcohol.    Clean your hands often  • Wash your hands often with soap and water for at least 20 seconds, especially after blowing your nose, coughing, or sneezing; going to the bathroom; and before eating or preparing food.     • If soap and water are not readily available, use an alcohol-based hand  with at least 60% alcohol, covering  all surfaces of your hands and rubbing them together until they feel dry.    • Soap and water are the best option if hands are visibly dirty. Avoid touching your eyes, nose, and mouth with unwashed hands.    Avoid sharing personal household items  • You should not share dishes, drinking glasses, cups, eating utensils, towels, or bedding with other people or pets in your home.   • After using these items, they should be washed thoroughly with soap and water.    Clean all “high-touch” surfaces everyday  • High touch surfaces include counters, tabletops, doorknobs, bathroom fixtures, toilets, phones, keyboards, tablets, and bedside tables.   • Also, clean any surfaces that may have blood, stool, or body fluids on them.   • Use a household cleaning spray or wipe, according to the label instructions. Labels contain instructions for safe and effective use of the cleaning product, including precautions you should take when applying the product, such as wearing gloves and making sure you have good ventilation during use of the product.    Monitor your symptoms  • Seek prompt medical attention if your illness is worsening (e.g., difficulty breathing).   • Before seeking care, call your healthcare provider and tell them that you have, or are being evaluated for, COVID-19.   • Put on a facemask before you enter the facility.     • These steps will help the healthcare provider’s office to keep other people in the office or waiting room from getting infected or exposed.   • Persons who are placed under active monitoring or facilitated self-monitoring should follow instructions provided by their local health department or occupational health professionals, as appropriate.  • If you have a medical emergency and need to call 911, notify the dispatch personnel that you have, or are being evaluated for COVID-19. If possible, put on a facemask before emergency medical services arrive.    Discontinuing home isolation  Patients with  confirmed COVID-19 should remain under home isolation precautions until the risk of secondary transmission to others is thought to be low. The decision to discontinue home isolation precautions should be made on a case-by-case basis, in consultation with healthcare providers and state and local health departments.    The below content are for household members, intimate partners, and caregivers of a patient with symptomatic laboratory-confirmed COVID-19 or a patient under investigation:    Household members, intimate partners, and caregivers may have close contact with a person with symptomatic, laboratory-confirmed COVID-19 or a person under investigation.     Close contacts should monitor their health; they should call their healthcare provider right away if they develop symptoms suggestive of COVID-19 (e.g., fever, cough, shortness of breath)     Close contacts should also follow these recommendations:  • Make sure that you understand and can help the patient follow their healthcare provider’s instructions for medication(s) and care. You should help the patient with basic needs in the home and provide support for getting groceries, prescriptions, and other personal needs.  • Monitor the patient’s symptoms. If the patient is getting sicker, call his or her healthcare provider and tell them that the patient has laboratory-confirmed COVID-19. This will help the healthcare provider’s office take steps to keep other people in the office or waiting room from getting infected. Ask the healthcare provider to call the local or state health department for additional guidance. If the patient has a medical emergency and you need to call 911, notify the dispatch personnel that the patient has, or is being evaluated for COVID-19.  • Household members should stay in another room or be  from the patient as much as possible. Household members should use a separate bedroom and bathroom, if available.  • Prohibit visitors  who do not have an essential need to be in the home.  • Household members should care for any pets in the home. Do not handle pets or other animals while sick.  For more information, see COVID-19 and Animals.  • Make sure that shared spaces in the home have good air flow, such as by an air conditioner or an opened window, weather permitting.  • Perform hand hygiene frequently. Wash your hands often with soap and water for at least 20 seconds or use an alcohol-based hand  that contains 60 to 95% alcohol, covering all surfaces of your hands and rubbing them together until they feel dry. Soap and water should be used preferentially if hands are visibly dirty.  • Avoid touching your eyes, nose, and mouth with unwashed hands.  • The patient should wear a facemask when you are around other people. If the patient is not able to wear a facemask (for example, because it causes trouble breathing), you, as the caregiver, should wear a mask when you are in the same room as the patient.  • Wear a disposable facemask and gloves when you touch or have contact with the patient’s blood, stool, or body fluids, such as saliva, sputum, nasal mucus, vomit, or urine.   o Throw out disposable facemasks and gloves after using them. Do not reuse.  o When removing personal protective equipment, first remove and dispose of gloves. Then, immediately clean your hands with soap and water or alcohol-based hand . Next, remove and dispose of facemask, and immediately clean your hands again with soap and water or alcohol-based hand .  • Avoid sharing household items with the patient. You should not share dishes, drinking glasses, cups, eating utensils, towels, bedding, or other items. After the patient uses these items, you should wash them thoroughly (see below “Wash laundry thoroughly”).  • Clean all “high-touch” surfaces, such as counters, tabletops, doorknobs, bathroom fixtures, toilets, phones, keyboards, tablets, and  bedside tables, every day. Also, clean any surfaces that may have blood, stool, or body fluids on them.   o Use a household cleaning spray or wipe, according to the label instructions. Labels contain instructions for safe and effective use of the cleaning product including precautions you should take when applying the product, such as wearing gloves and making sure you have good ventilation during use of the product.  • Wash laundry thoroughly.   o Immediately remove and wash clothes or bedding that have blood, stool, or body fluids on them.  o Wear disposable gloves while handling soiled items and keep soiled items away from your body. Clean your hands (with soap and water or an alcohol-based hand ) immediately after removing your gloves.  o Read and follow directions on labels of laundry or clothing items and detergent. In general, using a normal laundry detergent according to washing machine instructions and dry thoroughly using the warmest temperatures recommended on the clothing label.  • Place all used disposable gloves, facemasks, and other contaminated items in a lined container before disposing of them with other household waste. Clean your hands (with soap and water or an alcohol-based hand ) immediately after handling these items. Soap and water should be used preferentially if hands are visibly dirty.  • Discuss any additional questions with your state or local health department or healthcare provider.    Adapted from information provided by the Centers for Disease Control and Prevention.  For more information, visit https://www.cdc.gov/coronavirus/2019-ncov/hcp/guidance-prevent-spread.htmlViral Illness, Adult  Viruses are tiny germs that can get into a person's body and cause illness. There are many different types of viruses, and they cause many types of illness. Viral illnesses can range from mild to severe. They can affect various parts of the body.  Short-term conditions that are  caused by a virus include colds and the flu (influenza). Long-term conditions that are caused by a virus include herpes, shingles, and HIV (human immunodeficiency virus) infection. A few viruses have been linked to certain cancers.  What are the causes?  Many types of viruses can cause illness. Viruses invade cells in your body, multiply, and cause the infected cells to work abnormally or die. When these cells die, they release more of the virus. When this happens, you develop symptoms of the illness, and the virus continues to spread to other cells. If the virus takes over the function of the cell, it can cause the cell to divide and grow out of control. This happens when a virus causes cancer.  Different viruses get into the body in different ways. You can get a virus by:  · Swallowing food or water that has come in contact with the virus (is contaminated).  · Breathing in droplets that have been coughed or sneezed into the air by an infected person.  · Touching a surface that has been contaminated with the virus and then touching your eyes, nose, or mouth.  · Being bitten by an insect or animal that carries the virus.  · Having sexual contact with a person who is infected with the virus.  · Being exposed to blood or fluids that contain the virus, either through an open cut or during a transfusion.  If a virus enters your body, your body's defense system (immune system) will try to fight the virus. You may be at higher risk for a viral illness if your immune system is weak.  What are the signs or symptoms?  You may have these symptoms, depending on the type of virus and the location of the cells that it invades:  · Cold and flu viruses:  ? Fever.  ? Headache.  ? Sore throat.  ? Muscle aches.  ? Stuffy nose (nasal congestion).  ? Cough.  · Digestive system (gastrointestinal) viruses:  ? Fever.  ? Pain in the abdomen.  ? Nausea.  ? Diarrhea.  · Liver viruses (hepatitis):  ? Loss of appetite.  ? Tiredness.  ? Skin or  the white parts of your eyes turning yellow (jaundice).  · Brain and spinal cord viruses:  ? Fever.  ? Headache.  ? Stiff neck.  ? Nausea and vomiting.  ? Confusion or sleepiness.  · Skin viruses:  ? Warts.  ? Itching.  ? Rash.  · Sexually transmitted viruses:  ? Discharge.  ? Swelling.  ? Redness.  ? Rash.  How is this diagnosed?  This condition may be diagnosed based on one or more of the following:  · Symptoms.  · Medical history.  · Physical exam.  · Blood test, sample of mucus from your lungs (sputum sample), stool sample, or a swab of body fluids or a skin sore (lesion).  How is this treated?  Viruses can be hard to treat because they live within cells. Antibiotic medicines do not treat viruses because these medicines do not get inside cells. Treatment for a viral illness may include:  · Resting and drinking plenty of fluids.  · Medicines to relieve symptoms. These can include over-the-counter medicine for pain and fever, medicines for cough or congestion, and medicines to relieve diarrhea.  · Antiviral medicines. These medicines are available only for certain types of viruses.  Some viral illnesses can be prevented with vaccinations. A common example is the flu shot.  Follow these instructions at home:  Medicines  · Take over-the-counter and prescription medicines only as told by your health care provider.  · If you were prescribed an antiviral medicine, take it as told by your health care provider. Do not stop taking the antiviral even if you start to feel better.  · Be aware of when antibiotics are needed and when they are not needed. Antibiotics do not treat viruses. You may get an antibiotic if your health care provider thinks that you may have, or are at risk for, a bacterial infection and you have a viral infection.  ? Do not ask for an antibiotic prescription if you have been diagnosed with a viral illness. Antibiotics will not make your illness go away faster.  ? Frequently taking antibiotics when  they are not needed can lead to antibiotic resistance. When this develops, the medicine no longer works against the bacteria that it normally fights.  General instructions    · Drink enough fluids to keep your urine pale yellow.  · Rest as much as possible.  · Return to your normal activities as told by your health care provider. Ask your health care provider what activities are safe for you.  · Keep all follow-up visits as told by your health care provider. This is important.    How is this prevented?  To reduce your risk of viral illness:  · Wash your hands often with soap and water for at least 20 seconds. If soap and water are not available, use hand .  · Avoid touching your nose, eyes, and mouth, especially if you have not washed your hands recently.  · If anyone in your household has a viral infection, clean all household surfaces that may have been in contact with the virus. Use soap and hot water. You may also use bleach that you have added water to (diluted).  · Stay away from people who are sick with symptoms of a viral infection.  · Do not share items such as toothbrushes and water bottles with other people.  · Keep your vaccinations up to date. This includes getting a yearly flu shot.  · Eat a healthy diet and get plenty of rest.  Contact a health care provider if:  · You have symptoms of a viral illness that do not go away.  · Your symptoms come back after going away.  · Your symptoms get worse.  Get help right away if you have:  · Trouble breathing.  · A severe headache or a stiff neck.  · Severe vomiting or pain in your abdomen.  These symptoms may represent a serious problem that is an emergency. Do not wait to see if the symptoms will go away. Get medical help right away. Call your local emergency services (911 in the [place] Do not drive yourself to the hospital.  Summary  · Viruses are types of germs that can get into a person's body and cause illness. Viral illnesses can range from mild  to severe. They can affect various parts of the body.  · Viruses can be hard to treat. There are medicines to relieve symptoms, and there are some antiviral medicines.  · If you were prescribed an antiviral medicine, take it as told by your health care provider. Do not stop taking the antiviral even if you start to feel better.  · Contact a health care provider if you have symptoms of a viral illness that do not go away.  This information is not intended to replace advice given to you by your health care provider. Make sure you discuss any questions you have with your health care provider.  Document Revised: 05/03/2021 Document Reviewed: 10/27/2020  Elsevier Patient Education © 2021 [employer]

## 2021-12-18 NOTE — PROGRESS NOTES
Subjective   Mercy Duran is a 51 y.o. female.     Potential exposure four days ago, the next day she got a tickle in her throat, cough. Today she had a low grade fever. She recently was sick with RSV last month. She did an at home Covid-19 test which was positive, but had a faint line. She wants a PCR to confirm for her employer.       The following portions of the patient's history were reviewed and updated as appropriate: allergies, current medications, past family history, past medical history, past social history, past surgical history, and problem list.    Review of Systems   Constitutional: Positive for fever.   HENT: Positive for sore throat and voice change.    Respiratory: Positive for cough.    Gastrointestinal: Positive for diarrhea (normal for her). Negative for nausea and vomiting.   Musculoskeletal: Positive for myalgias.       Objective   Physical Exam  Constitutional:       General: She is not in acute distress.     Appearance: She is well-developed. She is not diaphoretic.   Pulmonary:      Effort: Pulmonary effort is normal.   Neurological:      Mental Status: She is alert and oriented to person, place, and time.   Psychiatric:         Behavior: Behavior normal.           Assessment/Plan   Diagnoses and all orders for this visit:    1. COVID-19 (Primary)  -     COVID-19,[facility] ROUTINE, NP/OP SWAB IN TRANSPORT MEDIA OR ESWAB 72 HR TAT - Swab, Nasopharynx; Future        We will consider her positive since she has had an exposure, symptoms and positive home test. PCR ordered per patient request for her employer.       The use of a video visit has been reviewed with the patient and verbal informed consent has been obtained. Myself and Mercy Duran participated in this visit. The patient is located in [place], [place]. I am located in [place], [place]. Mychart and Zoom were utilized. I spent 20 minutes in the patient's chart for this visit.

## 2022-07-11 RX ORDER — ESOMEPRAZOLE MAGNESIUM 40 MG/1
CAPSULE, DELAYED RELEASE ORAL
Qty: 90 CAPSULE | Refills: 0 | Status: SHIPPED | OUTPATIENT
Start: 2022-07-11 | End: 2022-10-14

## 2022-10-08 NOTE — PROGRESS NOTES
Subjective     Chief Complaint   Patient presents with   • Gynecologic Exam     AC. Pt had MG today.        History of Present Illness    Mercy Duran is a 49 y.o.  who presents for annual exam.  Patient has had a prior LAVH and left salpingo-oophorectomy.  Her  has had renal cancer but is done well since having a kidney removed.  He is doing well.  Patient has seen a primary care doctor and has had a lot of labs drawn.  Pt has lost 60 lbs. patient is feeling well overall.  She is enjoying her pool at her house and playing with her grandchildren.  She occasionally has some night sweats but they are not bothering her.    Obstetric History:  OB History        2    Para   2    Term   2            AB        Living   2       SAB        TAB        Ectopic        Molar        Multiple        Live Births   2               Menstrual History:     No LMP recorded. Patient has had a hysterectomy.         Current contraception: status post hysterectomy  History of abnormal Pap smear: yes - ANTONINO 1  Received Gardasil immunization: no  Perform regular self breast exam : yes  Family history of uterine or ovarian cancer: no  Family History of colon cancer: no  Family history of breast cancer: no    Mammogram: done today.  Colonoscopy: recommended.  DEXA: not indicated.    Exercise: swims 4 times per weeks   Calcium/Vitamin D: inadequate intake    The following portions of the patient's history were reviewed and updated as appropriate: allergies, current medications, past family history, past medical history, past social history, past surgical history and problem list.    Review of Systems    Review of Systems   Constitutional: Negative for fatigue.   Respiratory: Negative for shortness of breath.    Gastrointestinal: Negative for abdominal pain.   Genitourinary: Negative for dysuria.   Neurological: Negative for headaches.   Psychiatric/Behavioral: Negative for dysphoric mood.         Objective   Physical  "Exam    /78   Ht 170.2 cm (67\")   Wt 103 kg (226 lb)   Breastfeeding No   BMI 35.40 kg/m²     General:   alert, appears stated age and cooperative   Neck: thyroid normal to palpation   Heart: regular rate and rhythm   Lungs: clear to auscultation bilaterally   Abdomen: soft, non-tender, without masses or organomegaly   Breast: inspection negative, no nipple discharge or bleeding, no masses or nodularity palpable   Vulva: normal, Bartholin's, Urethra, Mulford's normal   Vagina: normal mucosa, normal discharge   Cervix:    Uterus:    Adnexa: no mass, fullness, tenderness   Rectal: normal rectal, no masses     Assessment/Plan   Mercy was seen today for gynecologic exam.    Diagnoses and all orders for this visit:    Encounter for gynecological examination without abnormal finding  -     Ambulatory Referral For Screening Colonoscopy    Patient is doing well overall.  Congratulated her on her weight loss.  She will be referred for colonoscopy.    All questions answered.  Breast self exam technique reviewed and patient encouraged to perform self-exam monthly.  Discussed healthy lifestyle modifications.  Recommended 30 minutes of aerobic exercise five times per week.  Discussed calcium needs to prevent osteoporosis.                 " Patient/Caregiver provided printed discharge information.

## 2022-10-14 ENCOUNTER — OFFICE VISIT (OUTPATIENT)
Dept: GASTROENTEROLOGY | Facility: CLINIC | Age: 52
End: 2022-10-14

## 2022-10-14 VITALS
SYSTOLIC BLOOD PRESSURE: 116 MMHG | HEIGHT: 67 IN | BODY MASS INDEX: 40.56 KG/M2 | WEIGHT: 258.4 LBS | DIASTOLIC BLOOD PRESSURE: 80 MMHG

## 2022-10-14 DIAGNOSIS — R11.2 NAUSEA AND VOMITING, UNSPECIFIED VOMITING TYPE: ICD-10-CM

## 2022-10-14 DIAGNOSIS — K22.70 BARRETT'S ESOPHAGUS WITHOUT DYSPLASIA: ICD-10-CM

## 2022-10-14 DIAGNOSIS — R10.13 DYSPEPSIA: ICD-10-CM

## 2022-10-14 DIAGNOSIS — K21.9 GASTROESOPHAGEAL REFLUX DISEASE, UNSPECIFIED WHETHER ESOPHAGITIS PRESENT: Primary | ICD-10-CM

## 2022-10-14 PROCEDURE — 99213 OFFICE O/P EST LOW 20 MIN: CPT

## 2022-10-14 RX ORDER — BUPROPION HYDROCHLORIDE 300 MG/1
300 TABLET ORAL EVERY MORNING
COMMUNITY
Start: 2022-10-02

## 2022-10-14 RX ORDER — ESOMEPRAZOLE MAGNESIUM 40 MG/1
40 CAPSULE, DELAYED RELEASE ORAL 2 TIMES DAILY
Qty: 180 CAPSULE | Refills: 3 | Status: SHIPPED | OUTPATIENT
Start: 2022-10-14

## 2022-10-14 RX ORDER — LIOTHYRONINE SODIUM 5 UG/1
5 TABLET ORAL DAILY
COMMUNITY
Start: 2022-10-02

## 2022-10-14 RX ORDER — FUROSEMIDE 20 MG/1
20 TABLET ORAL AS NEEDED
COMMUNITY
Start: 2022-06-03 | End: 2023-06-03

## 2022-10-14 RX ORDER — LEVOTHYROXINE SODIUM 200 MCG
200 TABLET ORAL DAILY
COMMUNITY
Start: 2022-09-21

## 2022-10-14 NOTE — PROGRESS NOTES
PATIENT INFORMATION  Mercy Duran       - 1970    CHIEF COMPLAINT  Chief Complaint   Patient presents with   • Heartburn          • Moore's esop;hagus       HISTORY OF PRESENT ILLNESS  Here today to follow-up for C/S and EGD 2021 and now coming in with complaints of gag reflux when brushing teeth, puking food undigested from night before. No pain medication, nor diabetes. Always some bloating and gastric discomfort. Ozempic increased 2 months ago which increased need for pepto. 2 weeks ago is when the vomiting started. Has been self medicating with periods of BID Nexium until improved, then back to once a day. Prior to ozempic even. Cannot tolerate salads, decreased appetite.    Daily BM, regulary, always liquid, just once in AM unless certain meals like salads. Norm since gallbladder. No probiotic or fiber.    Repeat EGD and colon 2026      REVIEWED PERTINENT RESULTS/ LABS  Lab Results   Component Value Date    CASEREPORT  2021     Surgical Pathology Report                         Case: [id number]                                  Authorizing Provider:  Zane Arevalo        Collected:           2021 03:01 PM                                 MD Ivan                                                                   Ordering Location:     [facility]     Received:            2021 03:28 PM                                 [facility]                                                     Pathologist:           Vannessa Wheeler MD                                                          Specimens:   1) - Gastric, Antrum, gastric bioipsy                                                               2) - Esophagus, Distal, distal esophagus biopsy                                            FINALDX  2021     1. Stomach, Biopsy:    A. Gastric antral mucosa with chronic inactive gastritis and reactive epithelial changes.   B. Negative for intestinal  metaplasia.   C. Negative for H. pylori-type organisms on routine stain.    2. Esophagus, Distal, Biopsy:   A. Squamocolumnar mucosa with intestinal metaplasia, consistent with Moore's esophagus.   B. Negative for dysplasia.    jab/pkm        Lab Results   Component Value Date    HGB 13.8 05/02/2022    MCV 91.9 05/02/2022     05/02/2022    ALT 18 05/02/2022    AST 21 05/02/2022    HGBA1C 5.4 10/06/2022    TRIG 94 08/28/2020      No results found.    REVIEW OF SYSTEMS  Review of Systems   Constitutional: Negative for activity change, chills, fever and unexpected weight change.   HENT: Negative for congestion.    Eyes: Negative for visual disturbance.   Respiratory: Negative for shortness of breath.    Cardiovascular: Negative for chest pain and palpitations.   Gastrointestinal: Positive for abdominal distention, abdominal pain and diarrhea. Negative for blood in stool.   Endocrine: Negative for cold intolerance and heat intolerance.   Genitourinary: Negative for hematuria.   Musculoskeletal: Negative for gait problem.   Skin: Negative for color change.   Allergic/Immunologic: Negative for immunocompromised state.   Neurological: Negative for weakness and light-headedness.   Hematological: Negative for adenopathy.   Psychiatric/Behavioral: Negative for sleep disturbance. The patient is not nervous/anxious.          ACTIVE PROBLEMS  Patient Active Problem List    Diagnosis    • Nausea & vomiting [R11.2]    • Moore's esophagus without dysplasia [K22.70]    • Encounter for screening for malignant neoplasm of colon [Z12.11]    • Generalized abdominal pain [R10.84]    • Gastroesophageal reflux disease with esophagitis without hemorrhage [K21.00]    • Epigastric pain [R10.13]    • Dyspepsia [R10.13]    • EBV infection [B27.90]    • Hip dysplasia, congenital [Q65.89]    • Bilateral hip bursitis [M70.71, M70.72]    • Obesity (BMI 30.0-34.9) [E66.9]    • PILO (obstructive sleep apnea) [G47.33]    • Essential  hypertension [I10]    • Atopic rhinitis [J30.9]    • Degeneration of intervertebral disc of mid-cervical region [M50.320]    • Edema [R60.9]    • Gastroesophageal reflux disease [K21.9]    • Hyperglycemia [R73.9]    • Hyperlipidemia [E78.5]    • Hypothyroidism [E03.9]    • Migraine [G43.909]    • Reactive airway disease [J45.909]    • Goiter [E04.9]    • Thyroid nodule [E04.1]          PAST MEDICAL HISTORY  Past Medical History:   Diagnosis Date   • ASCUS with positive high risk human papillomavirus of vagina    • ANTONINO I (cervical intraepithelial neoplasia I)    • Dysmenorrhea    • Edema    • Danny Bell infection    • Fatigue    • Fibrocystic breast changes    • GERD (gastroesophageal reflux disease)    • Goiter    • H/O colposcopy with cervical biopsy    • Hashimoto's thyroiditis    • History of prior pregnancies      2 (total no.)   • HSV-1 (herpes simplex virus 1) infection    • Hypothyroidism    • Joint pain    • Left breast lump    • Mastalgia    • Menorrhagia    • Methicillin resistant Staphylococcus aureus infection    • MRSA (methicillin resistant staph aureus) culture positive    • PILO (obstructive sleep apnea)    • Pelvic pain in female    • Vaginal delivery          SURGICAL HISTORY  Past Surgical History:   Procedure Laterality Date   • CERVICAL DISCECTOMY ANTERIOR     •  SECTION     • COLONOSCOPY Left 2021    Procedure: COLONOSCOPY;  Surgeon: Zane Arevalo MD;  [place] [facility] [place] [place]  Service: Gastroenterology;  Laterality: Left;   • DILATATION AND CURETTAGE     • ENDOMETRIAL ABLATION W/ NOVASURE     • ENDOSCOPY Left 2021    Procedure: ESOPHAGOGASTRODUODENOSCOPY;  Surgeon: Zane Arevalo MD;  [place] [facility] [place];  Service: Gastroenterology;  Laterality: Left;   • HYSTERECTOMY     • LAPAROSCOPIC ASSISTED VAGINAL HYSTERECTOMY SALPINGO OOPHORECTOMY      LSO only - 3 cm benign serous cystadenoma   • LAPAROSCOPIC CHOLECYSTECTOMY     • SALPINGO  OOPHORECTOMY Left    • TUBAL ABDOMINAL LIGATION           FAMILY HISTORY  Family History   Problem Relation Age of Onset   • Cancer Paternal Grandfather         Prostate   • Other Other         High Blood Pressure   • Hypertension Father    • Sleep apnea Father    • Lung cancer Paternal Aunt    • Esophageal cancer Paternal Uncle    • Pancreatic cancer Paternal Uncle         pt declines genetic testing    • No Known Problems Mother    • No Known Problems Sister    • No Known Problems Brother    • No Known Problems Daughter    • No Known Problems Son    • No Known Problems Maternal Grandmother    • No Known Problems Paternal Grandmother    • No Known Problems Maternal Aunt    • BRCA 1/2 Neg Hx    • Colon cancer Neg Hx    • Endometrial cancer Neg Hx    • Ovarian cancer Neg Hx          SOCIAL HISTORY  Social History     Occupational History   • Not on file   Tobacco Use   • Smoking status: Never   • Smokeless tobacco: Never   Vaping Use   • Vaping Use: Never used   Substance and Sexual Activity   • Alcohol use: Yes     Comment: rare   • Drug use: No   • Sexual activity: Not Currently     Partners: Male     Birth control/protection: Surgical     Comment: francine had renal cancer         CURRENT MEDICATIONS    Current Outpatient Medications:   •  buPROPion XL (WELLBUTRIN XL) 300 MG 24 hr tablet, Take 1 tablet by mouth Every Morning., Disp: , Rfl:   •  furosemide (LASIX) 20 MG tablet, Take 1 tablet by mouth As Needed., Disp: , Rfl:   •  liothyronine (CYTOMEL) 5 MCG tablet, Take 1 tablet by mouth Daily., Disp: , Rfl:   •  Semaglutide,0.25 or 0.5MG/DOS, (OZEMPIC) 2 MG/1.5ML solution pen-injector, 1 mg 1 (One) Time Per Week., Disp: , Rfl:   •  Synthroid 200 MCG tablet, Take 1 tablet by mouth Daily., Disp: , Rfl:   •  esomeprazole (nexIUM) 40 MG capsule, Take 1 capsule by mouth 2 (Two) Times a Day., Disp: 180 capsule, Rfl: 3    ALLERGIES  Adhesive tape and Victoza [liraglutide]    VITALS  Vitals:    10/14/22 1047   BP: 116/80  "  BP Location: Left arm   Patient Position: Sitting   Cuff Size: Large Adult   Weight: 117 kg (258 lb 6.4 oz)   Height: 170.2 cm (67\")       PHYSICAL EXAM  Debilities/Disabilities Identified: None  Emotional Behavior: Appropriate  Wt Readings from Last 3 Encounters:   10/14/22 117 kg (258 lb 6.4 oz)   11/12/21 120 kg (264 lb)   04/26/21 114 kg (251 lb)     Ht Readings from Last 1 Encounters:   10/14/22 170.2 cm (67\")     Body mass index is 40.47 kg/m².  Physical Exam  Constitutional:       General: She is not in acute distress.     Appearance: Normal appearance. She is not ill-appearing.   HENT:      Head: Normocephalic and atraumatic.      Mouth/Throat:      Mouth: Mucous membranes are moist.      Pharynx: No posterior oropharyngeal erythema.   Eyes:      General: No scleral icterus.  Cardiovascular:      Rate and Rhythm: Normal rate and regular rhythm.      Pulses: Normal pulses.      Heart sounds: Normal heart sounds.   Pulmonary:      Effort: Pulmonary effort is normal.      Breath sounds: Normal breath sounds.   Abdominal:      General: Abdomen is flat. Bowel sounds are normal. There is no distension.      Palpations: Abdomen is soft. There is no mass.      Tenderness: There is no abdominal tenderness. There is no guarding or rebound. Negative signs include Maravilla's sign.      Hernia: No hernia is present.   Musculoskeletal:      Cervical back: Neck supple.   Skin:     General: Skin is warm.      Capillary Refill: Capillary refill takes less than 2 seconds.   Neurological:      General: No focal deficit present.      Mental Status: She is alert.   Psychiatric:         Mood and Affect: Mood normal.         Behavior: Behavior normal.         Thought Content: Thought content normal.         Judgment: Judgment normal.         CLINICAL DATA REVIEWED   reviewed previous lab results and integrated with today's visit, reviewed notes from other physicians and/or last GI encounter, reviewed previous endoscopy results and " available photos, reviewed surgical pathology results from previous biopsies    ASSESSMENT  Diagnoses and all orders for this visit:    Gastroesophageal reflux disease, unspecified whether esophagitis present    Nausea and vomiting, unspecified vomiting type    Dyspepsia    Moore's esophagus without dysplasia    Other orders  -     buPROPion XL (WELLBUTRIN XL) 300 MG 24 hr tablet; Take 1 tablet by mouth Every Morning.  -     furosemide (LASIX) 20 MG tablet; Take 1 tablet by mouth As Needed.  -     liothyronine (CYTOMEL) 5 MCG tablet; Take 1 tablet by mouth Daily.  -     Synthroid 200 MCG tablet; Take 1 tablet by mouth Daily.  -     esomeprazole (nexIUM) 40 MG capsule; Take 1 capsule by mouth 2 (Two) Times a Day.          PLAN    Increase PPI to BID. OTC antacids as needed. Recommend stopping ozempic, but counseled on GP diet to see if tolerating medication better. Repear colon/EGD 2024.    Return in about 6 months (around 4/14/2023).    I have discussed the above plan with the patient.  They verbalize understanding and are in agreement with the plan.  They have been advised to contact the office for any questions, concerns, or changes related to their health.

## 2022-11-15 ENCOUNTER — APPOINTMENT (OUTPATIENT)
Dept: WOMENS IMAGING | Facility: HOSPITAL | Age: 52
End: 2022-11-15

## 2022-11-15 PROCEDURE — 77067 SCR MAMMO BI INCL CAD: CPT | Performed by: RADIOLOGY

## 2022-11-15 PROCEDURE — 77063 BREAST TOMOSYNTHESIS BI: CPT | Performed by: RADIOLOGY

## 2023-06-08 ENCOUNTER — OFFICE VISIT (OUTPATIENT)
Dept: GASTROENTEROLOGY | Facility: CLINIC | Age: 53
End: 2023-06-08
Payer: COMMERCIAL

## 2023-06-08 ENCOUNTER — TELEPHONE (OUTPATIENT)
Dept: GASTROENTEROLOGY | Facility: CLINIC | Age: 53
End: 2023-06-08

## 2023-06-08 VITALS
SYSTOLIC BLOOD PRESSURE: 122 MMHG | WEIGHT: 234 LBS | DIASTOLIC BLOOD PRESSURE: 76 MMHG | BODY MASS INDEX: 36.73 KG/M2 | HEIGHT: 67 IN

## 2023-06-08 DIAGNOSIS — K22.70 BARRETT'S ESOPHAGUS WITHOUT DYSPLASIA: ICD-10-CM

## 2023-06-08 DIAGNOSIS — K21.00 GASTROESOPHAGEAL REFLUX DISEASE WITH ESOPHAGITIS WITHOUT HEMORRHAGE: Primary | ICD-10-CM

## 2023-06-08 RX ORDER — TIRZEPATIDE 10 MG/.5ML
INJECTION, SOLUTION SUBCUTANEOUS
COMMUNITY
Start: 2023-06-03

## 2023-06-08 RX ORDER — LANOLIN ALCOHOL/MO/W.PET/CERES
1000 CREAM (GRAM) TOPICAL DAILY
COMMUNITY
Start: 2023-05-16

## 2023-06-08 RX ORDER — SUCRALFATE 1 G/1
1 TABLET ORAL 4 TIMES DAILY
Qty: 120 TABLET | Refills: 3 | Status: SHIPPED | OUTPATIENT
Start: 2023-06-08

## 2023-06-08 NOTE — TELEPHONE ENCOUNTER
PT CALLED [facility] FOR AN APPT.  WAS SCHEDULED FOR FIRST AVAILABLE IN AUGUST    PT STATES SHE IS HAVING A SEVERE BOUGHT OF HEARTBURN/REFLUX. SO MUCH THAT ITS BURNING HER THROAT.    SHE WOULD LIKE TO BE SEEN SOONER IF POSSIBLE.    PT WAS SEEN IN OCTOBER '22 WITH CAROLYN

## 2023-06-08 NOTE — TELEPHONE ENCOUNTER
Patient has been called, states she is taking her PPI.   She has been added on to this afternoon with Lydia.

## 2023-06-08 NOTE — PROGRESS NOTES
PATIENT INFORMATION  Mercy Duran       - 1970    CHIEF COMPLAINT  Chief Complaint   Patient presents with    Heartburn    Abdominal Pain       HISTORY OF PRESENT ILLNESS  Here today for Barretts follow-up    At LOV was intermittently increasing PPI to BID for flares, then backing down. Had been vomiting undigested food from night before, with strong gag reflux.    After LOV switched to mounjaro from ozempic and no issues for months. Last night got home from flight did not eat much, laid down and had severe acid reflux, severe burning, coughing, almost went to the ER. Throat is slowly calming, stomach is sore. Prior to changing GLP-1 was having nocturnal occurrences, none since changing.    Normal BM this morning, no melena or hematochezia.    21 EGD and Colon for dyspepsia positive for reactive gastritis, reflux with Barretts, negative for HP, dysplasia. Normal colon, personal hx polyps.     Heartburn  She complains of abdominal pain.   Abdominal Pain  Her past medical history is significant for GERD.     REVIEWED PERTINENT RESULTS/ LABS  Lab Results   Component Value Date    CASEREPORT  2021     Surgical Pathology Report                         Case: [id number]                                  Authorizing Provider:  Zane Arevalo        Collected:           2021 03:01 PM                                 MD Ivan                                                                   Ordering Location:     [facility]     Received:            2021 03:28 PM                                 [facility] MAIN OR                                                     Pathologist:           Vannessa Wheeler MD                                                          Specimens:   1) - Gastric, Antrum, gastric bioipsy                                                               2) - Esophagus, Distal, distal esophagus biopsy                                             FINALDX  04/16/2021     1. Stomach, Biopsy:    A. Gastric antral mucosa with chronic inactive gastritis and reactive epithelial changes.   B. Negative for intestinal metaplasia.   C. Negative for H. pylori-type organisms on routine stain.    2. Esophagus, Distal, Biopsy:   A. Squamocolumnar mucosa with intestinal metaplasia, consistent with Moore's esophagus.   B. Negative for dysplasia.    jab/pkm        Lab Results   Component Value Date    HGB 13.8 10/06/2022    MCV 88.0 10/06/2022     10/06/2022    ALT 18 05/02/2022    AST 21 05/02/2022    HGBA1C 5.4 10/06/2022    TRIG 94 08/28/2020      No results found.    REVIEW OF SYSTEMS  Review of Systems   Constitutional: Negative.    HENT:  Positive for trouble swallowing and voice change.    Eyes: Negative.    Respiratory:  Positive for chest tightness.    Cardiovascular: Negative.    Gastrointestinal:  Positive for abdominal pain.   Endocrine: Negative.    Genitourinary: Negative.    Musculoskeletal:  Positive for back pain.   Skin: Negative.    Allergic/Immunologic: Negative.    Neurological: Negative.    Hematological: Negative.    Psychiatric/Behavioral: Negative.         ACTIVE PROBLEMS  Patient Active Problem List    Diagnosis     Nausea & vomiting [R11.2]     Moore's esophagus without dysplasia [K22.70]     Encounter for screening for malignant neoplasm of colon [Z12.11]     Generalized abdominal pain [R10.84]     Gastroesophageal reflux disease with esophagitis without hemorrhage [K21.00]     Epigastric pain [R10.13]     Dyspepsia [R10.13]     EBV infection [B27.90]     Hip dysplasia, congenital [Q65.89]     Bilateral hip bursitis [M70.71, M70.72]     Obesity (BMI 30.0-34.9) [E66.9]     PILO (obstructive sleep apnea) [G47.33]     Essential hypertension [I10]     Atopic rhinitis [J30.9]     Degeneration of intervertebral disc of mid-cervical region [M50.320]     Edema [R60.9]     Gastroesophageal reflux disease [K21.9]     Hyperglycemia [R73.9]      Hyperlipidemia [E78.5]     Hypothyroidism [E03.9]     Migraine [G43.909]     Reactive airway disease [J45.909]     Goiter [E04.9]     Thyroid nodule [E04.1]          PAST MEDICAL HISTORY  Past Medical History:   Diagnosis Date    ASCUS with positive high risk human papillomavirus of vagina     ANTONINO I (cervical intraepithelial neoplasia I)     Dysmenorrhea     Edema     Danny Bell infection     Fatigue     Fibrocystic breast changes     GERD (gastroesophageal reflux disease)     Goiter     H/O colposcopy with cervical biopsy     Hashimoto's thyroiditis     History of prior pregnancies      2 (total no.)    HSV-1 (herpes simplex virus 1) infection     Hypothyroidism     Joint pain     Left breast lump     Mastalgia     Menorrhagia     Methicillin resistant Staphylococcus aureus infection     MRSA (methicillin resistant staph aureus) culture positive     PILO (obstructive sleep apnea)     Pelvic pain in female     Vaginal delivery          SURGICAL HISTORY  Past Surgical History:   Procedure Laterality Date    CERVICAL DISCECTOMY ANTERIOR       SECTION      COLONOSCOPY Left 2021    Procedure: COLONOSCOPY;  Surgeon: Zane Arevalo MD;  [place] [facility] [place] [facility];  Service: Gastroenterology;  Laterality: Left;    DILATATION AND CURETTAGE      ENDOMETRIAL ABLATION W/ NOVASURE      ENDOSCOPY Left 2021    Procedure: ESOPHAGOGASTRODUODENOSCOPY;  Surgeon: Zane Arevalo MD;  Location: [facility];  Service: Gastroenterology;  Laterality: Left;    HYSTERECTOMY      LAPAROSCOPIC ASSISTED VAGINAL HYSTERECTOMY SALPINGO OOPHORECTOMY      LSO only - 3 cm benign serous cystadenoma    LAPAROSCOPIC CHOLECYSTECTOMY      SALPINGO OOPHORECTOMY Left     TUBAL ABDOMINAL LIGATION           FAMILY HISTORY  Family History   Problem Relation Age of Onset    Cancer Paternal Grandfather         Prostate    Other Other         High Blood Pressure    Hypertension Father     Sleep apnea Father      "Lung cancer Paternal Aunt     Esophageal cancer Paternal Uncle     Pancreatic cancer Paternal Uncle         pt declines genetic testing     No Known Problems Mother     No Known Problems Sister     No Known Problems Brother     No Known Problems Daughter     No Known Problems Son     No Known Problems Maternal Grandmother     No Known Problems Paternal Grandmother     No Known Problems Maternal Aunt     BRCA 1/2 Neg Hx     Colon cancer Neg Hx     Endometrial cancer Neg Hx     Ovarian cancer Neg Hx          SOCIAL HISTORY  Social History     Occupational History    Not on file   Tobacco Use    Smoking status: Never    Smokeless tobacco: Never   Vaping Use    Vaping Use: Never used   Substance and Sexual Activity    Alcohol use: Yes     Comment: rare    Drug use: No    Sexual activity: Not Currently     Partners: Male     Birth control/protection: Surgical     Comment: francine had renal cancer         CURRENT MEDICATIONS    Current Outpatient Medications:     buPROPion XL (WELLBUTRIN XL) 300 MG 24 hr tablet, Take 1 tablet by mouth Every Morning., Disp: , Rfl:     esomeprazole (nexIUM) 40 MG capsule, Take 1 capsule by mouth 2 (Two) Times a Day., Disp: 180 capsule, Rfl: 3    liothyronine (CYTOMEL) 5 MCG tablet, Take 1 tablet by mouth Daily., Disp: , Rfl:     Mounjaro 10 MG/0.5ML solution pen-injector, , Disp: , Rfl:     Synthroid 200 MCG tablet, Take 1 tablet by mouth Daily., Disp: , Rfl:     vitamin B-12 (CYANOCOBALAMIN) 1000 MCG tablet, Take 1 tablet by mouth Daily., Disp: , Rfl:     sucralfate (Carafate) 1 g tablet, Take 1 tablet by mouth 4 (Four) Times a Day., Disp: 120 tablet, Rfl: 3    ALLERGIES  Adhesive tape and Victoza [liraglutide]    VITALS  Vitals:    06/08/23 1451   BP: 122/76   BP Location: Left arm   Patient Position: Sitting   Cuff Size: Adult   Weight: 106 kg (234 lb)   Height: 170.2 cm (67.01\")       PHYSICAL EXAM  Debilities/Disabilities Identified: None  Emotional Behavior: Appropriate  Wt Readings " "from Last 3 Encounters:   06/08/23 106 kg (234 lb)   10/14/22 117 kg (258 lb 6.4 oz)   11/12/21 120 kg (264 lb)     Ht Readings from Last 1 Encounters:   06/08/23 170.2 cm (67.01\")     Body mass index is 36.64 kg/m².  Physical Exam  Constitutional:       General: She is not in acute distress.     Appearance: Normal appearance. She is not ill-appearing.   HENT:      Head: Normocephalic and atraumatic.      Mouth/Throat:      Mouth: Mucous membranes are moist.      Pharynx: No posterior oropharyngeal erythema.   Eyes:      General: No scleral icterus.  Cardiovascular:      Rate and Rhythm: Normal rate and regular rhythm.      Heart sounds: Normal heart sounds.   Pulmonary:      Effort: Pulmonary effort is normal.      Breath sounds: Normal breath sounds.   Abdominal:      General: Abdomen is flat. Bowel sounds are normal. There is no distension.      Palpations: Abdomen is soft. There is no mass.      Tenderness: There is generalized abdominal tenderness and tenderness in the epigastric area. There is no guarding or rebound. Negative signs include Maravilla's sign.      Hernia: No hernia is present.      Comments: Epigastric worst   Musculoskeletal:      Cervical back: Neck supple.   Skin:     General: Skin is warm.      Capillary Refill: Capillary refill takes less than 2 seconds.   Neurological:      General: No focal deficit present.      Mental Status: She is alert and oriented to person, place, and time.   Psychiatric:         Mood and Affect: Mood normal.         Behavior: Behavior normal.         Thought Content: Thought content normal.         Judgment: Judgment normal.       CLINICAL DATA REVIEWED   reviewed previous lab results and integrated with today's visit, reviewed notes from other physicians and/or last GI encounter, reviewed previous endoscopy results and available photos, reviewed surgical pathology results from previous biopsies      ASSESSMENT  Diagnoses and all orders for this " visit:    Gastroesophageal reflux disease with esophagitis without hemorrhage    Moore's esophagus without dysplasia    Other orders  -     vitamin B-12 (CYANOCOBALAMIN) 1000 MCG tablet; Take 1 tablet by mouth Daily.  -     Mounjaro 10 MG/0.5ML solution pen-injector  -     sucralfate (Carafate) 1 g tablet; Take 1 tablet by mouth 4 (Four) Times a Day.          PLAN    BID PPI  Sucralfate QID    No follow-ups on file.    I have discussed the above plan with the patient.  They verbalize understanding and are in agreement with the plan.  They have been advised to contact the office for any questions, concerns, or changes related to their health.

## 2023-09-18 ENCOUNTER — TELEPHONE (OUTPATIENT)
Dept: GASTROENTEROLOGY | Facility: CLINIC | Age: 53
End: 2023-09-18
Payer: COMMERCIAL

## 2023-09-18 NOTE — TELEPHONE ENCOUNTER
Approvedtoday  Request Reference Number: [id number]. ESOMEPRA MAG CAP 40MG DR is approved through 09/18/2024. Your patient may now fill this prescription and it will be covered.

## 2023-10-30 ENCOUNTER — OFFICE VISIT (OUTPATIENT)
Dept: GASTROENTEROLOGY | Facility: CLINIC | Age: 53
End: 2023-10-30
Payer: COMMERCIAL

## 2023-10-30 VITALS
HEIGHT: 67 IN | DIASTOLIC BLOOD PRESSURE: 82 MMHG | BODY MASS INDEX: 32.58 KG/M2 | SYSTOLIC BLOOD PRESSURE: 110 MMHG | WEIGHT: 207.6 LBS

## 2023-10-30 DIAGNOSIS — K21.9 GASTROESOPHAGEAL REFLUX DISEASE, UNSPECIFIED WHETHER ESOPHAGITIS PRESENT: Primary | ICD-10-CM

## 2023-10-30 DIAGNOSIS — K22.70 BARRETT'S ESOPHAGUS WITHOUT DYSPLASIA: ICD-10-CM

## 2023-10-30 PROCEDURE — 99213 OFFICE O/P EST LOW 20 MIN: CPT | Performed by: INTERNAL MEDICINE

## 2023-10-30 RX ORDER — PREDNISONE 10 MG/1
10 TABLET ORAL
COMMUNITY
Start: 2023-10-30

## 2023-10-30 NOTE — PROGRESS NOTES
PATIENT INFORMATION  Mercy Duran       - 1970    CHIEF COMPLAINT  Chief Complaint   Patient presents with    Heartburn    Moore's esophagus       HISTORY OF PRESENT ILLNESS  Tried the sucralfate but had an increase in HA so stopped    Does manage her constipation with hydration but also is lactose intolerant and has to avoid Dairy so would consider her lactose intolerant and needs to smoothie, Also use soluble fiber    New facila=al pain and sounds like Trigeminal neuralgia and is what the Prednisone is for        REVIEWED PERTINENT RESULTS/ LABS  Lab Results   Component Value Date    CASEREPORT  2021     Surgical Pathology Report                         Case: [id number]                                  Authorizing Provider:  Zane Arevalo        Collected:           2021 03:01 PM                                 MD Ivan                                                                   Ordering Location:     [facility] SURGERY     Received:            2021 03:28 PM                                 [facility] MAIN OR                                                     Pathologist:           Vannessa Wheeler MD                                                          Specimens:   1) - Gastric, Antrum, gastric bioipsy                                                               2) - Esophagus, Distal, distal esophagus biopsy                                            FINALDX  2021     1. Stomach, Biopsy:    A. Gastric antral mucosa with chronic inactive gastritis and reactive epithelial changes.   B. Negative for intestinal metaplasia.   C. Negative for H. pylori-type organisms on routine stain.    2. Esophagus, Distal, Biopsy:   A. Squamocolumnar mucosa with intestinal metaplasia, consistent with Moore's esophagus.   B. Negative for dysplasia.    jab/pkm        Lab Results   Component Value Date    HGB 14.1 2023    MCV 87.9 2023      08/11/2023    ALT 18 05/02/2022    AST 21 05/02/2022    HGBA1C 5.2 08/11/2023    TRIG 94 08/28/2020      No results found.    REVIEW OF SYSTEMS  Review of Systems   Constitutional:  Negative for activity change, chills, fever and unexpected weight change.   HENT:  Negative for congestion.    Eyes:  Negative for visual disturbance.   Respiratory:  Negative for shortness of breath.    Cardiovascular:  Negative for chest pain and palpitations.   Gastrointestinal:  Positive for constipation. Negative for abdominal pain and blood in stool.   Endocrine: Negative for cold intolerance and heat intolerance.   Genitourinary:  Negative for hematuria.   Musculoskeletal:  Negative for gait problem.   Skin:  Negative for color change.   Allergic/Immunologic: Negative for immunocompromised state.   Neurological:  Negative for weakness and light-headedness.   Hematological:  Negative for adenopathy.   Psychiatric/Behavioral:  Negative for sleep disturbance. The patient is not nervous/anxious.          ACTIVE PROBLEMS  Patient Active Problem List    Diagnosis     Nausea & vomiting [R11.2]     Moore's esophagus without dysplasia [K22.70]     Encounter for screening for malignant neoplasm of colon [Z12.11]     Generalized abdominal pain [R10.84]     Gastroesophageal reflux disease with esophagitis without hemorrhage [K21.00]     Epigastric pain [R10.13]     Dyspepsia [R10.13]     EBV infection [B27.90]     Hip dysplasia, congenital [Q65.89]     Bilateral hip bursitis [M70.71, M70.72]     Obesity (BMI 30.0-34.9) [E66.9]     PILO (obstructive sleep apnea) [G47.33]     Essential hypertension [I10]     Atopic rhinitis [J30.9]     Degeneration of intervertebral disc of mid-cervical region [M50.320]     Edema [R60.9]     Gastroesophageal reflux disease [K21.9]     Hyperglycemia [R73.9]     Hyperlipidemia [E78.5]     Hypothyroidism [E03.9]     Migraine [G43.909]     Reactive airway disease [J45.909]     Goiter [E04.9]     Thyroid nodule  [E04.1]          PAST MEDICAL HISTORY  Past Medical History:   Diagnosis Date    ASCUS with positive high risk human papillomavirus of vagina     ANTONINO I (cervical intraepithelial neoplasia I)     Dysmenorrhea     Edema     Danny Bell infection     Fatigue     Fibrocystic breast changes     GERD (gastroesophageal reflux disease)     Goiter     H/O colposcopy with cervical biopsy     Hashimoto's thyroiditis     History of prior pregnancies      2 (total no.)    HSV-1 (herpes simplex virus 1) infection     Hypothyroidism     Joint pain     Left breast lump     Mastalgia     Menorrhagia     Methicillin resistant Staphylococcus aureus infection     MRSA (methicillin resistant staph aureus) culture positive     PILO (obstructive sleep apnea)     Pelvic pain in female     Vaginal delivery          SURGICAL HISTORY  Past Surgical History:   Procedure Laterality Date    CERVICAL DISCECTOMY ANTERIOR       SECTION      COLONOSCOPY Left 2021    Procedure: COLONOSCOPY;  Surgeon: Zane Arevalo MD;  [place] [facility] [place]  Service: Gastroenterology;  Laterality: Left;    DILATATION AND CURETTAGE      ENDOMETRIAL ABLATION W/ NOVASURE      ENDOSCOPY Left 2021    Procedure: ESOPHAGOGASTRODUODENOSCOPY;  Surgeon: Zane Arevalo MD;  [place] [facility] [place];  Service: Gastroenterology;  Laterality: Left;    HYSTERECTOMY      LAPAROSCOPIC ASSISTED VAGINAL HYSTERECTOMY SALPINGO OOPHORECTOMY      LSO only - 3 cm benign serous cystadenoma    LAPAROSCOPIC CHOLECYSTECTOMY      SALPINGO OOPHORECTOMY Left     TUBAL ABDOMINAL LIGATION           FAMILY HISTORY  Family History   Problem Relation Age of Onset    No Known Problems Mother     Hypertension Father     Sleep apnea Father     No Known Problems Sister     No Known Problems Brother     No Known Problems Son     No Known Problems Daughter     No Known Problems Maternal Grandmother     No Known Problems Paternal Grandmother     Cancer  Paternal Grandfather         Prostate    No Known Problems Maternal Aunt     Lung cancer Paternal Aunt     Esophageal cancer Paternal Uncle     Pancreatic cancer Paternal Uncle         pt declines genetic testing     Other Other         High Blood Pressure    BRCA 1/2 Neg Hx     Colon cancer Neg Hx     Endometrial cancer Neg Hx     Ovarian cancer Neg Hx          SOCIAL HISTORY  Social History     Occupational History    Not on file   Tobacco Use    Smoking status: Never    Smokeless tobacco: Never   Vaping Use    Vaping Use: Never used   Substance and Sexual Activity    Alcohol use: Yes     Comment: rare    Drug use: No    Sexual activity: Not Currently     Partners: Male     Birth control/protection: Surgical     Comment: francine had renal cancer         CURRENT MEDICATIONS    Current Outpatient Medications:     buPROPion XL (WELLBUTRIN XL) 300 MG 24 hr tablet, Take 1 tablet by mouth Every Morning., Disp: , Rfl:     esomeprazole (nexIUM) 40 MG capsule, Take 1 capsule by mouth 2 (Two) Times a Day., Disp: 180 capsule, Rfl: 3    furosemide (LASIX) 20 MG tablet, Take 1 tablet by mouth Daily., Disp: , Rfl:     ipratropium (ATROVENT) 0.06 % nasal spray, 2 sprays into the nostril(s) as directed by provider 4 (Four) Times a Day., Disp: 1 each, Rfl: 0    liothyronine (CYTOMEL) 5 MCG tablet, Take 1 tablet by mouth Daily., Disp: , Rfl:     Mounjaro 12.5 MG/0.5ML solution pen-injector, Inject 0.5 mL under the skin into the appropriate area as directed 1 (One) Time Per Week., Disp: , Rfl:     predniSONE (DELTASONE) 10 MG tablet, Take 1 tablet by mouth., Disp: , Rfl:     Synthroid 200 MCG tablet, Take 1 tablet by mouth Daily., Disp: , Rfl:     vitamin B-12 (CYANOCOBALAMIN) 1000 MCG tablet, Take 1 tablet by mouth Daily., Disp: , Rfl:     sucralfate (Carafate) 1 g tablet, Take 1 tablet by mouth 4 (Four) Times a Day. (Patient not taking: Reported on 10/30/2023), Disp: 120 tablet, Rfl: 3    ALLERGIES  Adhesive tape and Victoza  "[liraglutide]    VITALS  Vitals:    10/30/23 1359   BP: 110/82   BP Location: Left arm   Patient Position: Sitting   Cuff Size: Large Adult   Weight: 94.2 kg (207 lb 9.6 oz)   Height: 170.2 cm (67\")       PHYSICAL EXAM  Debilities/Disabilities Identified: None  Emotional Behavior: Appropriate  Wt Readings from Last 3 Encounters:   10/30/23 94.2 kg (207 lb 9.6 oz)   10/26/23 92.1 kg (203 lb)   06/08/23 106 kg (234 lb)     Ht Readings from Last 1 Encounters:   10/30/23 170.2 cm (67\")     Body mass index is 32.51 kg/m².  Physical Exam  Constitutional:       Appearance: She is well-developed. She is not diaphoretic.   HENT:      Head: Normocephalic and atraumatic.   Eyes:      General: No scleral icterus.     Conjunctiva/sclera: Conjunctivae normal.      Pupils: Pupils are equal, round, and reactive to light.   Neck:      Thyroid: No thyromegaly.   Cardiovascular:      Rate and Rhythm: Normal rate and regular rhythm.      Heart sounds: Normal heart sounds. No murmur heard.     No gallop.   Pulmonary:      Effort: Pulmonary effort is normal.      Breath sounds: Normal breath sounds. No wheezing or rales.   Abdominal:      General: Bowel sounds are normal. There is no distension or abdominal bruit.      Palpations: Abdomen is soft. There is no shifting dullness, fluid wave or mass.      Tenderness: There is no abdominal tenderness. There is no guarding. Negative signs include Maravilla's sign.      Hernia: There is no hernia in the ventral area.   Musculoskeletal:         General: Normal range of motion.      Cervical back: Normal range of motion and neck supple.   Lymphadenopathy:      Cervical: No cervical adenopathy.   Skin:     General: Skin is warm and dry.      Findings: No erythema or rash.   Neurological:      Mental Status: She is alert and oriented to person, place, and time.   Psychiatric:         Mood and Affect: Mood normal.         Behavior: Behavior normal.         CLINICAL DATA REVIEWED   reviewed previous " lab results and integrated with today's visit, reviewed notes from other physicians and/or last GI encounter, reviewed previous endoscopy results and available photos, reviewed surgical pathology results from previous biopsies    ASSESSMENT  Diagnoses and all orders for this visit:    Gastroesophageal reflux disease, unspecified whether esophagitis present    Moore's esophagus without dysplasia    Other orders  -     predniSONE (DELTASONE) 10 MG tablet; Take 1 tablet by mouth.          PLAN    Return in about 4 months (around 2/29/2024).    I have discussed the above plan with the patient.  They verbalize understanding and are in agreement with the plan.  They have been advised to contact the office for any questions, concerns, or changes related to their health.

## 2023-11-24 DIAGNOSIS — K21.9 GASTROESOPHAGEAL REFLUX DISEASE, UNSPECIFIED WHETHER ESOPHAGITIS PRESENT: ICD-10-CM

## 2023-11-24 DIAGNOSIS — K22.70 BARRETT'S ESOPHAGUS WITHOUT DYSPLASIA: Primary | ICD-10-CM

## 2023-11-27 RX ORDER — ESOMEPRAZOLE MAGNESIUM 40 MG/1
40 CAPSULE, DELAYED RELEASE ORAL 2 TIMES DAILY
Qty: 180 CAPSULE | Refills: 3 | Status: SHIPPED | OUTPATIENT
Start: 2023-11-27

## (undated) DEVICE — JACKT LAB F/R KNIT CUFF/COLR XLG BLU

## (undated) DEVICE — MSK ENDO PORT O2 POM ELITE CURAPLEX A/

## (undated) DEVICE — FLEX ADVANTAGE 1500CC: Brand: FLEX ADVANTAGE

## (undated) DEVICE — BITEBLOCK OMNI BLOC

## (undated) DEVICE — KT ORCA ORCAPOD DISP STRL

## (undated) DEVICE — Device

## (undated) DEVICE — SINGLE-USE BIOPSY FORCEPS: Brand: RADIAL JAW 4

## (undated) DEVICE — VIAL FORMLN CAP 10PCT 20ML

## (undated) DEVICE — CANN NASL CO2 TRULINK W/O2 A/